# Patient Record
Sex: FEMALE | Race: WHITE
[De-identification: names, ages, dates, MRNs, and addresses within clinical notes are randomized per-mention and may not be internally consistent; named-entity substitution may affect disease eponyms.]

---

## 2017-11-30 ENCOUNTER — HOSPITAL ENCOUNTER (OUTPATIENT)
Dept: HOSPITAL 76 - LAB.WCP | Age: 72
Discharge: HOME | End: 2017-11-30
Attending: FAMILY MEDICINE
Payer: MEDICARE

## 2017-11-30 DIAGNOSIS — M10.00: ICD-10-CM

## 2017-11-30 DIAGNOSIS — E11.9: Primary | ICD-10-CM

## 2017-11-30 LAB
ALBUMIN/GLOB SERPL: 1.2 {RATIO} (ref 1–2.2)
ANION GAP SERPL CALCULATED.4IONS-SCNC: 3 MMOL/L (ref 6–13)
BASOPHILS NFR BLD AUTO: 0.1 10^3/UL (ref 0–0.1)
BASOPHILS NFR BLD AUTO: 0.8 %
BILIRUB BLD-MCNC: 0.5 MG/DL (ref 0.2–1)
BUN SERPL-MCNC: 21 MG/DL (ref 6–20)
CALCIUM UR-MCNC: 9.3 MG/DL (ref 8.5–10.3)
CHLORIDE SERPL-SCNC: 109 MMOL/L (ref 101–111)
CHOLEST SERPL-MCNC: 151 MG/DL
CO2 SERPL-SCNC: 27 MMOL/L (ref 21–32)
CREAT SERPLBLD-SCNC: 1 MG/DL (ref 0.4–1)
EOSINOPHIL # BLD AUTO: 0.5 10^3/UL (ref 0–0.7)
EOSINOPHIL NFR BLD AUTO: 4.4 %
ERYTHROCYTE [DISTWIDTH] IN BLOOD BY AUTOMATED COUNT: 14 % (ref 12–15)
EST. AVERAGE GLUCOSE BLD GHB EST-MCNC: 206 MG/DL (ref 70–100)
GFRSERPLBLD MDRD-ARVRAT: 54 ML/MIN/{1.73_M2} (ref 89–?)
GLOBULIN SER-MCNC: 3.2 G/DL (ref 2.1–4.2)
GLUCOSE SERPL-MCNC: 119 MG/DL (ref 70–100)
HBA1C BLD-MCNC: 0.99 G/DL
HCT VFR BLD AUTO: 39.3 % (ref 37–47)
HDLC SERPL-MCNC: 37 MG/DL
HDLC SERPL: 4.1 {RATIO} (ref ?–4.4)
HGB UR QL STRIP: 12.7 G/DL (ref 12–16)
LDLC/HDLC SERPL: 2.4 {RATIO} (ref ?–4.4)
LYMPHOCYTES # SPEC AUTO: 3.1 10^3/UL (ref 1.5–3.5)
LYMPHOCYTES NFR BLD AUTO: 24.7 %
MCH RBC QN AUTO: 28.9 PG (ref 27–31)
MCHC RBC AUTO-ENTMCNC: 32.3 G/DL (ref 32–36)
MCV RBC AUTO: 89.5 FL (ref 81–99)
MONOCYTES # BLD AUTO: 0.9 10^3/UL (ref 0–1)
MONOCYTES NFR BLD AUTO: 7 %
NEUTROPHILS # BLD AUTO: 7.8 10^3/UL (ref 1.5–6.6)
NEUTROPHILS # SNV AUTO: 12.4 X10^3/UL (ref 4.8–10.8)
NEUTROPHILS NFR BLD AUTO: 63.1 %
NRBC # BLD AUTO: 0.1 /100WBC
PDW BLD AUTO: 8.8 FL (ref 7.9–10.8)
POTASSIUM SERPL-SCNC: 4.4 MMOL/L (ref 3.5–5)
PROT SPEC-MCNC: 7 G/DL (ref 6.7–8.2)
RBC MAR: 4.39 10^6/UL (ref 4.2–5.4)
SODIUM SERPLBLD-SCNC: 139 MMOL/L (ref 135–145)
TRIGL P FAST SERPL-MCNC: 131 MG/DL
URATE SERPL-MCNC: 8.1 MG/DL (ref 2.6–7.2)
VLDLC SERPL-SCNC: 26 MG/DL
WBC # BLD: 12.4 X10^3/UL

## 2017-11-30 PROCEDURE — 36415 COLL VENOUS BLD VENIPUNCTURE: CPT

## 2017-11-30 PROCEDURE — 80053 COMPREHEN METABOLIC PANEL: CPT

## 2017-11-30 PROCEDURE — 84550 ASSAY OF BLOOD/URIC ACID: CPT

## 2017-11-30 PROCEDURE — 83036 HEMOGLOBIN GLYCOSYLATED A1C: CPT

## 2017-11-30 PROCEDURE — 80061 LIPID PANEL: CPT

## 2017-11-30 PROCEDURE — 85025 COMPLETE CBC W/AUTO DIFF WBC: CPT

## 2018-02-02 ENCOUNTER — HOSPITAL ENCOUNTER (OUTPATIENT)
Dept: HOSPITAL 76 - EMS | Age: 73
End: 2018-02-02
Attending: SURGERY
Payer: MEDICARE

## 2018-02-02 ENCOUNTER — HOSPITAL ENCOUNTER (EMERGENCY)
Dept: HOSPITAL 76 - ED | Age: 73
Discharge: HOME | End: 2018-02-02
Payer: OTHER GOVERNMENT

## 2018-02-02 VITALS — DIASTOLIC BLOOD PRESSURE: 71 MMHG | SYSTOLIC BLOOD PRESSURE: 160 MMHG

## 2018-02-02 DIAGNOSIS — I10: ICD-10-CM

## 2018-02-02 DIAGNOSIS — R11.2: Primary | ICD-10-CM

## 2018-02-02 DIAGNOSIS — R19.7: ICD-10-CM

## 2018-02-02 DIAGNOSIS — Z79.4: ICD-10-CM

## 2018-02-02 DIAGNOSIS — E11.9: ICD-10-CM

## 2018-02-02 DIAGNOSIS — R42: Primary | ICD-10-CM

## 2018-02-02 LAB
ALBUMIN DIAFP-MCNC: 4.1 G/DL (ref 3.2–5.5)
ALBUMIN/GLOB SERPL: 1.2 {RATIO} (ref 1–2.2)
ALP SERPL-CCNC: 62 IU/L (ref 42–121)
ALT SERPL W P-5'-P-CCNC: 15 IU/L (ref 10–60)
ANION GAP SERPL CALCULATED.4IONS-SCNC: 8 MMOL/L (ref 6–13)
AST SERPL W P-5'-P-CCNC: 18 IU/L (ref 10–42)
BASE EXCESS BLDV CALC-SCNC: -0.2 MMOL/L
BASOPHILS NFR BLD AUTO: 0.1 10^3/UL (ref 0–0.1)
BASOPHILS NFR BLD AUTO: 0.6 %
BILIRUB BLD-MCNC: 0.7 MG/DL (ref 0.2–1)
BUN SERPL-MCNC: 26 MG/DL (ref 6–20)
CALCIUM UR-MCNC: 10 MG/DL (ref 8.5–10.3)
CHLORIDE SERPL-SCNC: 108 MMOL/L (ref 101–111)
CLARITY UR REFRACT.AUTO: CLEAR
CO2 BLDV-SCNC: 21.9 MMOL/L (ref 24–29)
CO2 SERPL-SCNC: 21 MMOL/L (ref 21–32)
CREAT SERPLBLD-SCNC: 0.8 MG/DL (ref 0.4–1)
EOSINOPHIL # BLD AUTO: 0.4 10^3/UL (ref 0–0.7)
EOSINOPHIL NFR BLD AUTO: 2.9 %
ERYTHROCYTE [DISTWIDTH] IN BLOOD BY AUTOMATED COUNT: 13.8 % (ref 12–15)
GFRSERPLBLD MDRD-ARVRAT: 70 ML/MIN/{1.73_M2} (ref 89–?)
GLOBULIN SER-MCNC: 3.3 G/DL (ref 2.1–4.2)
GLUCOSE SERPL-MCNC: 168 MG/DL (ref 70–100)
GLUCOSE UR QL STRIP.AUTO: NEGATIVE MG/DL
HGB UR QL STRIP: 13.3 G/DL (ref 12–16)
INFLUENZA A & B AG INTERPRET: (no result)
KETONES SERPL STRIP-SCNC: NEGATIVE MMOL/L
KETONES UR QL STRIP.AUTO: NEGATIVE MG/DL
LIPASE SERPL-CCNC: 13 U/L (ref 22–51)
LYMPHOCYTES # SPEC AUTO: 3 10^3/UL (ref 1.5–3.5)
LYMPHOCYTES NFR BLD AUTO: 24.4 %
MAGNESIUM SERPL-MCNC: 1.5 MG/DL (ref 1.7–2.8)
MCH RBC QN AUTO: 28.7 PG (ref 27–31)
MCHC RBC AUTO-ENTMCNC: 33 G/DL (ref 32–36)
MCV RBC AUTO: 86.8 FL (ref 81–99)
MONOCYTES # BLD AUTO: 0.7 10^3/UL (ref 0–1)
MONOCYTES NFR BLD AUTO: 5.4 %
NEUTROPHILS # BLD AUTO: 8.3 10^3/UL (ref 1.5–6.6)
NEUTROPHILS # SNV AUTO: 12.4 X10^3/UL (ref 4.8–10.8)
NEUTROPHILS NFR BLD AUTO: 66.7 %
NITRITE UR QL STRIP.AUTO: NEGATIVE
PCO2 BLDV: 26 MMHG (ref 41–51)
PDW BLD AUTO: 8.6 FL (ref 7.9–10.8)
PH BLDV: 7.53 [PH] (ref 7.31–7.41)
PH UR STRIP.AUTO: 6 PH (ref 5–7.5)
PHOSPHATE BLD-MCNC: 1.3 MG/DL (ref 2.5–4.6)
PLATELET # BLD: 276 10^3/UL (ref 130–450)
PO2 BLDV: 77.3 MMHG (ref 25–47)
PROT SPEC-MCNC: 7.4 G/DL (ref 6.7–8.2)
PROT UR STRIP.AUTO-MCNC: (no result) MG/DL
RBC # UR STRIP.AUTO: NEGATIVE /UL
RBC MAR: 4.65 10^6/UL (ref 4.2–5.4)
SODIUM SERPLBLD-SCNC: 137 MMOL/L (ref 135–145)
SP GR UR STRIP.AUTO: 1.02 (ref 1–1.03)
UROBILINOGEN UR QL STRIP.AUTO: (no result) E.U./DL
UROBILINOGEN UR STRIP.AUTO-MCNC: NEGATIVE MG/DL

## 2018-02-02 PROCEDURE — 83735 ASSAY OF MAGNESIUM: CPT

## 2018-02-02 PROCEDURE — 99284 EMERGENCY DEPT VISIT MOD MDM: CPT

## 2018-02-02 PROCEDURE — 87086 URINE CULTURE/COLONY COUNT: CPT

## 2018-02-02 PROCEDURE — 85025 COMPLETE CBC W/AUTO DIFF WBC: CPT

## 2018-02-02 PROCEDURE — 84484 ASSAY OF TROPONIN QUANT: CPT

## 2018-02-02 PROCEDURE — 83690 ASSAY OF LIPASE: CPT

## 2018-02-02 PROCEDURE — 81003 URINALYSIS AUTO W/O SCOPE: CPT

## 2018-02-02 PROCEDURE — 80053 COMPREHEN METABOLIC PANEL: CPT

## 2018-02-02 PROCEDURE — 82803 BLOOD GASES ANY COMBINATION: CPT

## 2018-02-02 PROCEDURE — 96376 TX/PRO/DX INJ SAME DRUG ADON: CPT

## 2018-02-02 PROCEDURE — 96374 THER/PROPH/DIAG INJ IV PUSH: CPT

## 2018-02-02 PROCEDURE — 96361 HYDRATE IV INFUSION ADD-ON: CPT

## 2018-02-02 PROCEDURE — 84100 ASSAY OF PHOSPHORUS: CPT

## 2018-02-02 PROCEDURE — 82009 KETONE BODYS QUAL: CPT

## 2018-02-02 PROCEDURE — 51701 INSERT BLADDER CATHETER: CPT

## 2018-02-02 PROCEDURE — 87275 INFLUENZA B AG IF: CPT

## 2018-02-02 PROCEDURE — 87276 INFLUENZA A AG IF: CPT

## 2018-02-02 PROCEDURE — 81001 URINALYSIS AUTO W/SCOPE: CPT

## 2018-02-02 NOTE — ED PHYSICIAN DOCUMENTATION
PD HPI NVD





- Stated complaint


Stated Complaint: NVD





- Chief complaint


Chief Complaint: Abd Pain





- History obtained from


History obtained from: Patient, Friend





- History of Present Illness


Timing - onset: Today


Timing - details: Abrupt onset, Still present


Associated symptoms: Dizzy.  No: Fever, Chest pain, Melena, Near syncope / 

syncope


Similar symptoms before: Has not had sx before


Recently seen: Not recently seen





- Additonal information


Additional information: 





Patient is a 73 year old female with a history of diabetes who is presenting to 

the emergency department for nausea, vomiting and diarrhea.  patient states 

that she woke up from sleep and was nauseated.  patient had two episodes of 

vomiting and two episodes of diarrhea.  patient called her neighbor to bring 

her in for evaluation.  Patient stated that her dizziness was room spinning and 

it changed with position.  Patient states that she had a similar episode a few 

years ago.  





Review of Systems


Constitutional: denies: Fever, Chills


Eyes: denies: Decreased vision


Ears: reports: Ear pain


Nose: denies: Congestion


Throat: reports: Reviewed and negative


Cardiac: denies: Chest pain / pressure, Palpitations


Respiratory: denies: Dyspnea, Cough


GI: reports: Nausea, Vomiting, Diarrhea.  denies: Abdominal Pain


: reports: Reviewed and negative


Skin: denies: Rash, Lesions


Musculoskeletal: denies: Neck pain, Back pain


Immunocompromised: denies: Immunocompromised





PD PAST MEDICAL HISTORY





- Past Medical History


Past Medical History: Yes


Cardiovascular: Hypertension


Endocrine/Autoimmune: Type 2 diabetes





- Past Surgical History


Past Surgical History: Yes


General: Cholecystectomy, Appendectomy, Hiatal hernia repair





- Present Medications


Home Medications: 


 Ambulatory Orders











 Medication  Instructions  Recorded  Confirmed


 


Atenolol 50 mg PO DAILY 03/14/14 01/05/17


 


Insulin Aspart (Vial) [NovoLOG] 13 - 34 units SUBQ TIDWM 03/14/14 01/05/17


 


Allopurinol 150 mg PO DAILY 12/31/15 01/05/17


 


Aspirin [Steve Chewable Aspirin] 81 mg PO DAILY 12/31/15 01/05/17


 


Gabapentin 300 mg PO QPM 12/31/15 01/05/17


 


Losartan [Cozaar] 50 mg PO DAILY 12/31/15 01/05/17


 


Simvastatin 20 mg PO QPM 12/31/15 01/05/17


 


Spironolactone 25 mg PO DAILY 12/31/15 01/05/17


 


Insulin Glargine [Lantus Solostar] 30 units SQ BID 05/31/16 01/05/17


 


Cholecalciferol (Vitamin D3) 2,000 unit PO DAILY 12/16/16 01/05/17





[Vitamin D3]   


 


Meclizine [Antivert] 25 mg PO Q6H #20 tablet 02/02/18 


 


Ondansetron Odt [Zofran] 4 mg TL Q6H PRN #20 tablet 02/02/18 














- Allergies


Allergies/Adverse Reactions: 


 Allergies











Allergy/AdvReac Type Severity Reaction Status Date / Time


 


succinylcholine Allergy Unknown Respiratory Verified 09/01/14 04:11


 


bacitracin AdvReac Unknown Rash Verified 09/01/14 04:11





[From Neosporin     





([id number])]     


 


bacitracin zinc * AdvReac Unknown Rash Verified 09/01/14 04:11





[From Neosporin     





([id number])]     


 


diphenhydramine HCl * AdvReac Unknown Edema Verified 09/01/14 04:11





[From Benadryl]     


 


neomycin sulfate * AdvReac Unknown Rash Verified 09/01/14 04:11





[From Neosporin     





([id number])]     


 


polymyxin B AdvReac Unknown Rash Verified 09/01/14 04:11





[From Neosporin     





([id number])]     


 


Sulfa (Sulfonamide AdvReac Unknown Rash Verified 09/01/14 04:11





Antibiotics)     














- Social History


Does the pt smoke?: No


Smoking Status: Never smoker


Does the pt drink ETOH?: No


Does the pt have substance abuse?: No





- Immunizations


Immunizations are current?: Yes





- POLST


Patient has POLST: No





PD ED PE NORMAL





- Vitals


Vital signs reviewed: Yes





- General


General: Alert and oriented X 3





- HEENT


HEENT: Atraumatic, PERRL, Moist mucous membranes





- Neck


Neck: Supple, no meningeal sign





- Cardiac


Cardiac: RRR, No murmur





- Respiratory


Respiratory: No respiratory distress





- Abdomen


Abdomen: Soft, Non distended





- Derm


Derm: Normal color, Warm and dry, No rash





- Extremities


Extremities: No deformity, Normal ROM s pain





- Neuro


Neuro: Alert and oriented X 3


Eye Opening: Spontaneous


Motor: Obeys Commands


Verbal: Oriented


GCS Score: 15





PD ED PE EXPANDED





- Cardiac


Cardiac: Terrence





- Neuro


Neuro: Alert and Oriented X 3, Normal motor, Normal Sensation, Normal Speech, 

Nystagmus (nystagmus with rightward gaze).  No: Weakness, Aphasia, Dysarthria





Results





- Vitals


Vitals: 


 Vital Signs - 24 hr











  02/02/18 02/02/18 02/02/18





  03:21 03:59 04:19


 


Temperature 36.0 C L  


 


Heart Rate 58 L 57 L 58 L


 


Respiratory 24 13 12





Rate   


 


Blood Pressure 181/68 H 141/61 H 135/50 H


 


O2 Saturation 98 96 94














  02/02/18





  04:29


 


Temperature 


 


Heart Rate 52 L


 


Respiratory 20





Rate 


 


Blood Pressure 160/71 H


 


O2 Saturation 98








 Oxygen











O2 Source                      Room air

















- Tele (time rhythm occurred)


  ** 0415


Telemetry / rhythm strip: Rate (58), NSR





- Labs


Labs: 


 Laboratory Tests











  02/02/18 02/02/18 02/02/18





  03:30 03:30 03:30


 


WBC  12.4 H  


 


RBC  4.65  


 


Hgb  13.3  


 


Hct  40.4  


 


MCV  86.8  


 


MCH  28.7  


 


MCHC  33.0  


 


RDW  13.8  


 


Plt Count  276  


 


MPV  8.6  


 


Neut #  8.3 H  


 


Lymph #  3.0  


 


Mono #  0.7  


 


Eos #  0.4  


 


Baso #  0.1  


 


Absolute Nucleated RBC  0.00  


 


Nucleated RBC %  0.0  


 


VBG pH    7.527 H


 


VBG pCO2    26.0 L


 


VBG pO2    77.3 H


 


VBG HCO3    21.1 L


 


VBG Total CO2    21.9 L


 


VBG O2 Saturation    97.1 H


 


VBG Base Excess    -0.2


 


Sodium   137 


 


Potassium   3.7 


 


Chloride   108 


 


Carbon Dioxide   21 


 


Anion Gap   8.0 


 


BUN   26 H 


 


Creatinine   0.8 


 


Estimated GFR (MDRD)   70 L 


 


Glucose   168 H 


 


Calcium   10.0 


 


Phosphorus   1.3 L 


 


Magnesium   1.5 L 


 


Total Bilirubin   0.7 


 


AST   18 


 


ALT   15 


 


Alkaline Phosphatase   62 


 


Total Protein   7.4 


 


Albumin   4.1 


 


Globulin   3.3 


 


Albumin/Globulin Ratio   1.2 


 


Lipase   13 L 


 


Urine Color   


 


Urine Clarity   


 


Urine pH   


 


Ur Specific Gravity   


 


Urine Protein   


 


Urine Glucose (UA)   


 


Urine Ketones   


 


Urine Occult Blood   


 


Urine Nitrite   


 


Urine Bilirubin   


 


Urine Urobilinogen   


 


Ur Leukocyte Esterase   


 


Ur Microscopic Review   


 


Urine Culture Comments   


 


Serum Ketones   NEGATIVE 


 


Influenza A (Rapid)   


 


Influenza B (Rapid)   


 


Influenza Types A,B Ag   














  02/02/18 02/02/18





  03:34 03:50


 


WBC  


 


RBC  


 


Hgb  


 


Hct  


 


MCV  


 


MCH  


 


MCHC  


 


RDW  


 


Plt Count  


 


MPV  


 


Neut #  


 


Lymph #  


 


Mono #  


 


Eos #  


 


Baso #  


 


Absolute Nucleated RBC  


 


Nucleated RBC %  


 


VBG pH  


 


VBG pCO2  


 


VBG pO2  


 


VBG HCO3  


 


VBG Total CO2  


 


VBG O2 Saturation  


 


VBG Base Excess  


 


Sodium  


 


Potassium  


 


Chloride  


 


Carbon Dioxide  


 


Anion Gap  


 


BUN  


 


Creatinine  


 


Estimated GFR (MDRD)  


 


Glucose  


 


Calcium  


 


Phosphorus  


 


Magnesium  


 


Total Bilirubin  


 


AST  


 


ALT  


 


Alkaline Phosphatase  


 


Total Protein  


 


Albumin  


 


Globulin  


 


Albumin/Globulin Ratio  


 


Lipase  


 


Urine Color   YELLOW


 


Urine Clarity   CLEAR


 


Urine pH   6.0


 


Ur Specific Gravity   1.020


 


Urine Protein   TRACE


 


Urine Glucose (UA)   NEGATIVE


 


Urine Ketones   NEGATIVE


 


Urine Occult Blood   NEGATIVE


 


Urine Nitrite   NEGATIVE


 


Urine Bilirubin   NEGATIVE


 


Urine Urobilinogen   0.2 (NORMAL)


 


Ur Leukocyte Esterase   NEGATIVE


 


Ur Microscopic Review   NOT INDICATED


 


Urine Culture Comments   NOT INDICATED


 


Serum Ketones  


 


Influenza A (Rapid)  Negative 


 


Influenza B (Rapid)  Negative 


 


Influenza Types A,B Ag  - 














PD MEDICAL DECISION MAKING





- ED course


Complexity details: reviewed old records, reviewed results, re-evaluated patient

, considered differential, d/w patient, d/w family


ED course: 





Patient was seen and examined at bedside.  IV access was gained and labs were 

drawn.  patient was treated with NS bolus and zofran.  Patient continued to 

have nausea and was treated with an additional 4mg of zofran and 25mg of 

meclizine.  Eply maneuver was tried but patient could not tolerate it.  Patient 

eventually responded to the therapy and symptoms were likely secondary to bpv.  

Patient and daughter were given handouts for home eply procedures and 

prescriptions were written.  patient required no further inpatient work up and 

was stable for discharge with outpatient follow up.   





Departure





- Departure


Disposition: 01 Home, Self Care


Clinical Impression: 


 Vertigo





Condition: Good


Instructions:  ED BPV Vertigo


Follow-Up: 


Heaven Alonzo DO [Primary Care Provider] - 


Prescriptions: 


Meclizine [Antivert] 25 mg PO Q6H #20 tablet


Ondansetron Odt [Zofran] 4 mg TL Q6H PRN #20 tablet


 PRN Reason: Nausea / Vomiting


Comments: 


Your symptoms today are being caused by benign positional vertigo.  If your 

symptoms return you should try the home eply maneuvers and try the meclizine.  

You can take the zofran as needed for nausea and make sure you stay well 

hydrated.  You should follow up with your doctor if your symptoms persist.  You 

may return to the emergency department at any time for new, worsening or 

uncontrollable symptoms.

## 2018-03-08 ENCOUNTER — HOSPITAL ENCOUNTER (OUTPATIENT)
Dept: HOSPITAL 76 - DI | Age: 73
Discharge: HOME | End: 2018-03-08
Attending: FAMILY MEDICINE
Payer: MEDICARE

## 2018-03-08 DIAGNOSIS — R20.2: Primary | ICD-10-CM

## 2018-03-08 DIAGNOSIS — R90.82: ICD-10-CM

## 2018-03-08 PROCEDURE — 70551 MRI BRAIN STEM W/O DYE: CPT

## 2018-03-08 NOTE — MRI REPORT
MRI BRAIN WITHOUT CONTRAST

 

INDICATION: 73-year-old female with facial paresthesias. Please assess.

 

TECHNIQUE:

1. T1 sagittal and fat-saturated T2 coronal.

2. Axial T1, FLAIR, T2* and DWI.

3. Thin slice, high-resolution, 3D balanced FFE and T1 fat saturated axials (internal auditory canals
).

 

COMPARISON: 01/31/2013

 

FINDINGS:

 

Internal Auditory Canals:

A detailed study of the internal auditory canals has been performed. According to the MRI technologis
t the patient provided a history of decreased balance and vertigo for one month. On the balanced FFE 
axial sequence, the vestibulocochlear nerve bundles are well seen in the CP angle cisterns and intern
al auditory canals bilaterally. No focal mass lesion is demonstrated. There appear to be normal fluid
 filled spaces in the distribution of the cochlea, vestibules, and semicircular canals bilaterally. T
here is no apparent dehiscence of either superior semicircular canal on the fat saturated T2 coronal 
sequence. No abnormal enlargement of either endolymphatic duct or sac is demonstrated.

 

Brain:

There is an essentially stable pattern of generalized, cerebral and cerebellar volume loss and associ
ated, mild third/lateral ventriculomegaly. No evidence of hydrocephalus.

 

A mild amount of white matter disease is identified in the supratentorial brain, essentially unchange
d. No apparent interval progression since prior study. Again noted is a more prominent, focal, cluste
r of T2 hyperintensities in the deep and subcortical white matter at anterior left temporal lobe, unc
hanged. The stability over the past 5 years effectively confirms a benign etiology.

 

There appear to be flow voids for the main intracranial arteries. No abnormal diffusion restriction i
s demonstrated. No evidence of acute or chronic hemorrhage on T2*GRE sequence.

 

No abnormal extraaxial fluid collection. No mass effect or midline shift.

 

There is concavity of the medial wall of the right orbit, consistent with the sequela of remote, blow
out-type fracture. There is associated, prolapse of intraorbital fat into the concave defect. No prol
apse of medial rectus muscle. No appreciable right-sided enophthalmos. Limited assessment of the orbi
ts reveals no other gross pathology.

 

There is minor mucosal thickening in a few ethmoid air cells. The paranasal sinuses are otherwise ess
entially clear. No mastoid or middle ear effusion is identified.

 

Marrow signal intensity in the regional skeletal structures is unremarkable.

 

IMPRESSION:

1. Age-appropriate senescent change showing no apparent interval progression when compared to examina
tion from 5 years ago.

2. A mild amount of white matter disease is again identified in the supratentorial brain showing no o
bvious interval progression when compared to the examination from January 2013. 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
esents chronic microangiopathy.

3. No other significant intracranial findings on this unenhanced brain MRI. In particular, no evidenc
e of infarction, hemorrhage, space-occupying mass lesion or other acute/subacute intracranial patholo
gy.

Referring Provider Line: [phone]

 

SITE ID: [id number]

## 2019-01-14 ENCOUNTER — HOSPITAL ENCOUNTER (OUTPATIENT)
Dept: HOSPITAL 76 - LAB.WCP | Age: 74
Discharge: HOME | End: 2019-01-14
Attending: FAMILY MEDICINE
Payer: OTHER GOVERNMENT

## 2019-01-14 DIAGNOSIS — E11.9: Primary | ICD-10-CM

## 2019-01-14 DIAGNOSIS — E83.42: ICD-10-CM

## 2019-01-14 DIAGNOSIS — M10.00: ICD-10-CM

## 2019-01-14 LAB
BASOPHILS NFR BLD AUTO: 0.1 10^3/UL (ref 0–0.1)
BASOPHILS NFR BLD AUTO: 0.6 %
CHOLEST SERPL-MCNC: 141 MG/DL
EOSINOPHIL # BLD AUTO: 0.4 10^3/UL (ref 0–0.7)
EOSINOPHIL NFR BLD AUTO: 4.1 %
ERYTHROCYTE [DISTWIDTH] IN BLOOD BY AUTOMATED COUNT: 13.3 % (ref 12–15)
HDLC SERPL-MCNC: 34 MG/DL
HDLC SERPL: 4.1 {RATIO} (ref ?–4.4)
HGB UR QL STRIP: 13 G/DL (ref 12–16)
LDLC SERPL CALC-MCNC: 71 MG/DL
LDLC/HDLC SERPL: 2.1 {RATIO} (ref ?–4.4)
LYMPHOCYTES # SPEC AUTO: 2.4 10^3/UL (ref 1.5–3.5)
LYMPHOCYTES NFR BLD AUTO: 24.7 %
MAGNESIUM SERPL-MCNC: 1.5 MG/DL (ref 1.7–2.8)
MCH RBC QN AUTO: 29 PG (ref 27–31)
MCHC RBC AUTO-ENTMCNC: 33.6 G/DL (ref 32–36)
MCV RBC AUTO: 86.4 FL (ref 81–99)
MONOCYTES # BLD AUTO: 0.7 10^3/UL (ref 0–1)
MONOCYTES NFR BLD AUTO: 7 %
NEUTROPHILS # BLD AUTO: 6.2 10^3/UL (ref 1.5–6.6)
NEUTROPHILS # SNV AUTO: 9.7 X10^3/UL (ref 4.8–10.8)
NEUTROPHILS NFR BLD AUTO: 63.6 %
PDW BLD AUTO: 8.6 FL (ref 7.9–10.8)
PLATELET # BLD: 320 10^3/UL (ref 130–450)
RBC MAR: 4.48 10^6/UL (ref 4.2–5.4)
URATE SERPL-MCNC: 9.2 MG/DL (ref 2.6–7.2)
VIT B12 SERPL-MCNC: 638 PG/ML (ref 180–914)
VLDLC SERPL-SCNC: 36 MG/DL

## 2019-01-14 PROCEDURE — 85025 COMPLETE CBC W/AUTO DIFF WBC: CPT

## 2019-01-14 PROCEDURE — 36415 COLL VENOUS BLD VENIPUNCTURE: CPT

## 2019-01-14 PROCEDURE — 80061 LIPID PANEL: CPT

## 2019-01-14 PROCEDURE — 83735 ASSAY OF MAGNESIUM: CPT

## 2019-01-14 PROCEDURE — 84443 ASSAY THYROID STIM HORMONE: CPT

## 2019-01-14 PROCEDURE — 84550 ASSAY OF BLOOD/URIC ACID: CPT

## 2019-01-14 PROCEDURE — 82607 VITAMIN B-12: CPT

## 2019-01-14 PROCEDURE — 83721 ASSAY OF BLOOD LIPOPROTEIN: CPT

## 2019-02-18 ENCOUNTER — HOSPITAL ENCOUNTER (OUTPATIENT)
Dept: HOSPITAL 76 - LAB.WCP | Age: 74
Discharge: HOME | End: 2019-02-18
Attending: FAMILY MEDICINE
Payer: MEDICARE

## 2019-02-18 DIAGNOSIS — E11.9: Primary | ICD-10-CM

## 2019-02-18 LAB
EST. AVERAGE GLUCOSE BLD GHB EST-MCNC: 183 MG/DL (ref 70–100)
HB2 TOTAL: 14 G/DL
HBA1C BLD-MCNC: 0.9 G/DL
HEMOGLOBIN A1C %: 8 % (ref 4.6–6.2)

## 2019-02-18 PROCEDURE — 83036 HEMOGLOBIN GLYCOSYLATED A1C: CPT

## 2019-02-18 PROCEDURE — 36415 COLL VENOUS BLD VENIPUNCTURE: CPT

## 2019-07-19 ENCOUNTER — HOSPITAL ENCOUNTER (OUTPATIENT)
Dept: HOSPITAL 76 - LAB | Age: 74
Discharge: HOME | End: 2019-07-19
Attending: ORTHOPAEDIC SURGERY
Payer: MEDICARE

## 2019-07-19 DIAGNOSIS — M65.342: ICD-10-CM

## 2019-07-19 DIAGNOSIS — G56.02: ICD-10-CM

## 2019-07-19 DIAGNOSIS — Z01.812: Primary | ICD-10-CM

## 2019-07-19 LAB
ANION GAP SERPL CALCULATED.4IONS-SCNC: 11 MMOL/L (ref 6–13)
BUN SERPL-MCNC: 22 MG/DL (ref 6–20)
CALCIUM UR-MCNC: 10 MG/DL (ref 8.5–10.3)
CHLORIDE SERPL-SCNC: 102 MMOL/L (ref 101–111)
CO2 SERPL-SCNC: 24 MMOL/L (ref 21–32)
CREAT SERPLBLD-SCNC: 1.1 MG/DL (ref 0.4–1)
GFRSERPLBLD MDRD-ARVRAT: 48 ML/MIN/{1.73_M2} (ref 89–?)
GLUCOSE SERPL-MCNC: 213 MG/DL (ref 70–100)
SODIUM SERPLBLD-SCNC: 137 MMOL/L (ref 135–145)

## 2019-07-19 PROCEDURE — 80048 BASIC METABOLIC PNL TOTAL CA: CPT

## 2019-07-19 PROCEDURE — 36415 COLL VENOUS BLD VENIPUNCTURE: CPT

## 2019-07-30 ENCOUNTER — HOSPITAL ENCOUNTER (OUTPATIENT)
Dept: HOSPITAL 76 - SDS | Age: 74
Discharge: HOME | End: 2019-07-30
Attending: ORTHOPAEDIC SURGERY
Payer: MEDICARE

## 2019-07-30 VITALS — DIASTOLIC BLOOD PRESSURE: 67 MMHG | SYSTOLIC BLOOD PRESSURE: 148 MMHG

## 2019-07-30 DIAGNOSIS — I10: ICD-10-CM

## 2019-07-30 DIAGNOSIS — G56.03: Primary | ICD-10-CM

## 2019-07-30 DIAGNOSIS — E11.42: ICD-10-CM

## 2019-07-30 DIAGNOSIS — M65.342: ICD-10-CM

## 2019-07-30 DIAGNOSIS — E66.9: ICD-10-CM

## 2019-07-30 PROCEDURE — 0LN80ZZ RELEASE LEFT HAND TENDON, OPEN APPROACH: ICD-10-PCS | Performed by: ORTHOPAEDIC SURGERY

## 2019-07-30 PROCEDURE — 64721 CARPAL TUNNEL SURGERY: CPT

## 2019-07-30 PROCEDURE — 26055 INCISE FINGER TENDON SHEATH: CPT

## 2019-07-30 PROCEDURE — 01N50ZZ RELEASE MEDIAN NERVE, OPEN APPROACH: ICD-10-PCS | Performed by: ORTHOPAEDIC SURGERY

## 2019-07-30 NOTE — OPERATIVE REPORT
DATE OF SERVICE: 07/30/2019

Physician: Lalo Almanza MD

 

POSTOPERATIVE DIAGNOSIS:  Left carpal tunnel syndrome and left ring finger trigger finger.

 

PROCEDURE PERFORMED:  Left carpal tunnel release and a release of left ring finger trigger finger.

 

OPERATING SURGEON:  Lalo Almanza MD

 

ANESTHESIA:  Local MAC with Mor Pratt.

 

INDICATIONS FOR SURGERY:  This is a 74-year-old female with progressive carpal tunnel syndromes and i
n her left hand and triggering of her ring finger.  She has failed nonoperative care and desires surg
ical repair.

 

DESCRIPTION OF OPERATIVE PROCEDURE:  Patient was taken to the operating room, was given a MAC anesthe
tic, a carpal tunnel block and a small block at the trigger finger release site with lidocaine and Ma
rcaine.  Following a delay in the prep and drape, patient's surgery was undertaken, first performing 
a 1-1/4 inch incision in the palm in line with the carpal tunnel and the ulnar border of the middle f
vishnu.  This incision was taken through skin and subcutaneous tissue down to the transverse carpal li
gament, which was divided in line with the incision.  The ultimate release extended from the distal f
lexion crease of the wrist to the proximal palmar flexion crease.  There were no abnormalities in the
 tunnel.  This area was irrigated and closed with interrupted 4-0 nylon.  Patient's ring finger was a
ddressed through a transverse incision overlying the A1 pulley and a careful spreading dissection aj
n to the pulley and a surgical release with a scalpel longitudinally of the A1 pulley.  This relieved
 triggering.  Wound was irrigated and also closed with 4-0 nylon interrupted.  Sterile dressings were
 applied.  Patient was taken to the recovery room in stable condition.

 

ESTIMATED BLOOD LOSS:  Minimal.

 

COMPLICATIONS:  None.

 

SPONGE AND NEEDLE COUNTS:  Correct. 

 

 

DD: 07/30/2019 12:38

TD: 07/30/2019 12:41

Job #: [id number]

## 2019-07-30 NOTE — ANESTHESIA
Pre-Anesthesia VS, & Labs





- Diagnosis





Left carpal tunnel syndrome





- Procedure





Left carpal tunnel release


Vital Signs: 





                                        











Temp Pulse Resp BP Pulse Ox


 


 36.5 C   68   14   154/89 H  99 


 


 07/30/19 07:32  07/30/19 07:32  07/30/19 07:32  07/30/19 07:32  07/30/19 07:32














                                        





Height                           4 ft 11 in


Weight (kg)                      100.5 kg


Body Mass Index                  45.8











- NPO


>8 hours





- Pregnancy


Is Patient Pregnant?: Not Applicable





- Lab Results


Lab results reviewed: No





Home Medications and Allergies


Home Medications: 


Ambulatory Orders





Magnesium Oxide [Magnesium] 400 mg PO QPM 07/15/19 


Thiamine HCl [Vitamin B-1] 100 mg PO DAILY 07/15/19 


Tolterodine Tartrate [Tolterodine Tartrate ER] 4 mg PO DAILY 07/15/19 


Triamcinolone 0.1% Cream [Kenalog 0.1% Cream] 1 applic TOP BID 07/15/19 











                                        





Atenolol 50 mg PO DAILY 03/14/14 


Insulin Aspart (Vial) [NovoLOG] 15 units SUBQ TIDWM 03/14/14 


Allopurinol 150 mg PO DAILY 12/31/15 


Gabapentin 300 mg PO QPM 12/31/15 


Losartan [Cozaar] 50 mg PO DAILY 12/31/15 


Simvastatin 20 mg PO QPM 12/31/15 


Spironolactone 25 mg PO DAILY 12/31/15 


Insulin Glargine [Lantus Solostar] 28 units SQ BID 05/31/16 


Magnesium Oxide [Magnesium] 400 mg PO QPM 07/15/19 


Thiamine HCl [Vitamin B-1] 100 mg PO DAILY 07/15/19 


Tolterodine Tartrate [Tolterodine Tartrate ER] 4 mg PO DAILY 07/15/19 


Triamcinolone 0.1% Cream [Kenalog 0.1% Cream] 1 applic TOP BID 07/15/19 








Allergies/Adverse Reactions: 


                                    Allergies











Allergy/AdvReac Type Severity Reaction Status Date / Time


 


succinylcholine Allergy Unknown Respiratory Verified 09/01/14 04:11


 


bacitracin AdvReac Unknown Rash Verified 09/01/14 04:11





[From Neosporin     





([id number])]     


 


bacitracin zinc * AdvReac Unknown Rash Verified 09/01/14 04:11





[From Neosporin     





([id number])]     


 


diphenhydramine HCl * AdvReac Unknown Edema Verified 09/01/14 04:11





[From Benadryl]     


 


neomycin sulfate * AdvReac Unknown Rash Verified 09/01/14 04:11





[From Neosporin     





([id number])]     


 


polymyxin B AdvReac Unknown Rash Verified 09/01/14 04:11





[From Neosporin     





([id number])]     


 


Sulfa (Sulfonamide AdvReac Unknown Rash Verified 09/01/14 04:11





Antibiotics)     














Anes History & Medical History





- Anesthetic History


Anesthesia Complications: reports: Other-see comment (Prolonged paralysis with 

succinylcholine)


Family history of Anesthesia Complications: Denies


Family history of Malignant Hyperthermia: Denies





- Medical History


Cardiovascular: reports: Hypertension, High cholesterol


Pulmonary: reports: Shortness of breath


Gastrointestinal: reports: None, Colon polyps


Urinary: reports: Incontinence


Neuro: reports: None


Musculoskeletal: reports: Osteoarthritis, Gout


Endocrine/Autoimmune: reports: Type 2 diabetes


Blood Disorders: reports: None


Skin: reports: Other


Smoking Status: Never smoker


Psychosocial: reports: No issues indicated





- Surgical History


General: Cholecystectomy, Appendectomy, Other





Exam


General: Alert, Oriented x3, Cooperative


Dental: Dentures full Upper, Partials Lower


Mouth Opening: Greater than 4 Fingerbreadths


Neck Mobility: Normal


Mallampati classification: I


Thyromental Distance: 4-6 cm


Respiratory: Lungs clear


Cardiovascular: Regular rate


Mental/Cognitive Status: Alert/Oriented X3





Plan


Anesthesia Type: General


Consent for Procedure(s) Verified and Reviewed: Yes


Code Status: Attempt Resuscitation


ASA classification: 3-Severe systemic disease


Is this case an emergency?: No

## 2020-01-02 ENCOUNTER — HOSPITAL ENCOUNTER (EMERGENCY)
Dept: HOSPITAL 76 - ED | Age: 75
Discharge: HOME | End: 2020-01-02
Payer: MEDICARE

## 2020-01-02 ENCOUNTER — HOSPITAL ENCOUNTER (OUTPATIENT)
Dept: HOSPITAL 76 - EMS | Age: 75
Discharge: ANOTHER HOSPITAL | End: 2020-01-02
Attending: SURGERY
Payer: MEDICARE

## 2020-01-02 VITALS — DIASTOLIC BLOOD PRESSURE: 78 MMHG | SYSTOLIC BLOOD PRESSURE: 169 MMHG

## 2020-01-02 DIAGNOSIS — E11.9: ICD-10-CM

## 2020-01-02 DIAGNOSIS — I10: ICD-10-CM

## 2020-01-02 DIAGNOSIS — R07.89: Primary | ICD-10-CM

## 2020-01-02 DIAGNOSIS — R07.9: Primary | ICD-10-CM

## 2020-01-02 LAB
ALBUMIN DIAFP-MCNC: 3.6 G/DL (ref 3.2–5.5)
ALBUMIN/GLOB SERPL: 1.1 {RATIO} (ref 1–2.2)
ALP SERPL-CCNC: 67 IU/L (ref 42–121)
ALT SERPL W P-5'-P-CCNC: 16 IU/L (ref 10–60)
ANION GAP SERPL CALCULATED.4IONS-SCNC: 10 MMOL/L (ref 6–13)
AST SERPL W P-5'-P-CCNC: 15 IU/L (ref 10–42)
BASOPHILS NFR BLD AUTO: 0 10^3/UL (ref 0–0.1)
BASOPHILS NFR BLD AUTO: 0.4 %
BILIRUB BLD-MCNC: 0.8 MG/DL (ref 0.2–1)
BUN SERPL-MCNC: 22 MG/DL (ref 6–20)
CALCIUM UR-MCNC: 9.4 MG/DL (ref 8.5–10.3)
CHLORIDE SERPL-SCNC: 99 MMOL/L (ref 101–111)
CO2 SERPL-SCNC: 25 MMOL/L (ref 21–32)
CREAT SERPLBLD-SCNC: 1 MG/DL (ref 0.4–1)
EOSINOPHIL # BLD AUTO: 0.3 10^3/UL (ref 0–0.7)
EOSINOPHIL NFR BLD AUTO: 2.7 %
ERYTHROCYTE [DISTWIDTH] IN BLOOD BY AUTOMATED COUNT: 12.9 % (ref 12–15)
GFRSERPLBLD MDRD-ARVRAT: 54 ML/MIN/{1.73_M2} (ref 89–?)
GLOBULIN SER-MCNC: 3.3 G/DL (ref 2.1–4.2)
GLUCOSE SERPL-MCNC: 201 MG/DL (ref 70–100)
HGB UR QL STRIP: 12.6 G/DL (ref 12–16)
INR PPP: 1.2 (ref 0.8–1.2)
LIPASE SERPL-CCNC: 20 U/L (ref 22–51)
LYMPHOCYTES # SPEC AUTO: 2.5 10^3/UL (ref 1.5–3.5)
LYMPHOCYTES NFR BLD AUTO: 25.5 %
MCH RBC QN AUTO: 28.7 PG (ref 27–31)
MCHC RBC AUTO-ENTMCNC: 32.6 G/DL (ref 32–36)
MCV RBC AUTO: 88.2 FL (ref 81–99)
MONOCYTES # BLD AUTO: 0.8 10^3/UL (ref 0–1)
MONOCYTES NFR BLD AUTO: 7.8 %
NEUTROPHILS # BLD AUTO: 6.2 10^3/UL (ref 1.5–6.6)
NEUTROPHILS # SNV AUTO: 9.8 X10^3/UL (ref 4.8–10.8)
NEUTROPHILS NFR BLD AUTO: 63.2 %
PDW BLD AUTO: 9.8 FL (ref 7.9–10.8)
PLATELET # BLD: 294 10^3/UL (ref 130–450)
PROT SPEC-MCNC: 6.9 G/DL (ref 6.7–8.2)
PROTHROM ACT/NOR PPP: 13.1 SECS (ref 9.9–12.6)
RBC MAR: 4.39 10^6/UL (ref 4.2–5.4)
SODIUM SERPLBLD-SCNC: 134 MMOL/L (ref 135–145)

## 2020-01-02 PROCEDURE — 85025 COMPLETE CBC W/AUTO DIFF WBC: CPT

## 2020-01-02 PROCEDURE — 80053 COMPREHEN METABOLIC PANEL: CPT

## 2020-01-02 PROCEDURE — 85610 PROTHROMBIN TIME: CPT

## 2020-01-02 PROCEDURE — 83690 ASSAY OF LIPASE: CPT

## 2020-01-02 PROCEDURE — 93005 ELECTROCARDIOGRAM TRACING: CPT

## 2020-01-02 PROCEDURE — 71045 X-RAY EXAM CHEST 1 VIEW: CPT

## 2020-01-02 PROCEDURE — 84484 ASSAY OF TROPONIN QUANT: CPT

## 2020-01-02 PROCEDURE — 36415 COLL VENOUS BLD VENIPUNCTURE: CPT

## 2020-01-02 PROCEDURE — 99284 EMERGENCY DEPT VISIT MOD MDM: CPT

## 2020-01-02 NOTE — XRAY REPORT
Reason:  chest pain

Procedure Date:  01/02/2020   

Accession Number:  [id number] / [id number]                    

Procedure:  XR  - Chest 1 View X-Ray CPT Code:  13417

 

***Final Report***

 

 

FULL RESULT:

 

 

EXAM:

CHEST RADIOGRAPHY 1 VIEW

 

EXAM DATE: 1/2/2020.

 

CLINICAL HISTORY: Chest pain.

 

COMPARISON: PA and lateral chest on 11/21/2017.

 

TECHNIQUE: AP chest at 1120.

 

FINDINGS:

Lungs/Pleura: Normal vasculature. The lungs are clear. No pleural fluid 

or pneumothorax.

 

Mediastinum: Normal cardiac and mediastinal contours. Atherosclerosis of 

the aorta.

 

Bones: Degenerative changes of the spine.

 

IMPRESSION: Normal for age. No change from 11/21/2017.

 

[facility]

## 2020-01-02 NOTE — ED PHYSICIAN DOCUMENTATION
PD HPI CHEST PAIN





- Stated complaint


Stated Complaint: CHEST PX





- Chief complaint


Chief Complaint: Cardiac





- History obtained from


History obtained from: Patient (74-year-old woman with history of diabetes but 

no known coronary disease presents with burning chest pain that lasted all day 

yesterday and through the night and then resolved with nitroglycerin just prior 

to arrival.  She is pain-free now.  There is no radiation to the pain.  No 

shortness of breath, nausea.  Her legs were swollen last night but are now.)





Review of Systems


Ten Systems: 10 systems reviewed and negative


Constitutional: denies: Fever, Chills, Fatigue


Nose: denies: Rhinorrhea / runny nose, Congestion


Cardiac: denies: Palpitations, Calf pain


Respiratory: denies: Cough, Hemoptysis, Wheezing





PD PAST MEDICAL HISTORY





- Past Medical History


Past Medical History: Yes


Cardiovascular: Hypertension, High cholesterol


Respiratory: Shortness of breath


Neuro: None


Endocrine/Autoimmune: Type 2 diabetes


GI: None, Colon polyps


: Incontinence


HEENT: Chronic vision loss, Other


Psych: Depression


Musculoskeletal: Osteoarthritis, Gout


Derm: Other





- Past Surgical History


Past Surgical History: Yes


General: Cholecystectomy, Appendectomy, Other





- Present Medications


Home Medications: 


                                Ambulatory Orders











 Medication  Instructions  Recorded  Confirmed


 


Atenolol 50 mg PO DAILY 03/14/14 07/30/19


 


Insulin Aspart (Vial) [NovoLOG] 15 units SUBQ TIDWM 03/14/14 07/30/19


 


Gabapentin 300 mg PO QPM 12/31/15 07/30/19


 


Losartan [Cozaar] 50 mg PO DAILY 12/31/15 07/30/19


 


Simvastatin 20 mg PO QPM 12/31/15 07/30/19


 


Spironolactone 25 mg PO DAILY 12/31/15 07/30/19


 


allopurinoL [Allopurinol] 150 mg PO DAILY 12/31/15 07/30/19


 


Insulin Glargine [Lantus Solostar] 28 units SQ BID 05/31/16 07/30/19


 


Magnesium Oxide [Magnesium] 400 mg PO QPM 07/15/19 07/15/19


 


Thiamine HCl [Vitamin B-1] 100 mg PO DAILY 07/15/19 07/15/19


 


Tolterodine Tartrate [Tolterodine 4 mg PO DAILY 07/15/19 07/15/19





Tartrate ER]   


 


Triamcinolone 0.1% Cream [Kenalog 1 applic TOP BID 07/15/19 07/15/19





0.1% Cream]   


 


Hydrocodone/Acetaminophen 1 each PO Q4HR PRN #20 tablet 07/30/19 





[Hydrocodone-Acetamin 5-325 mg]   














- Allergies


Allergies/Adverse Reactions: 


                                    Allergies











Allergy/AdvReac Type Severity Reaction Status Date / Time


 


succinylcholine Allergy Unknown Respiratory Verified 01/02/20 10:52


 


bacitracin AdvReac Unknown Rash Verified 01/02/20 10:52





[From Neosporin     





([id number])]     


 


bacitracin zinc * AdvReac Unknown Rash Verified 01/02/20 10:52





[From Neosporin     





([id number])]     


 


diphenhydramine HCl * AdvReac Unknown Edema Verified 01/02/20 10:52





[From Benadryl]     


 


neomycin sulfate * AdvReac Unknown Rash Verified 01/02/20 10:52





[From Neosporin     





([id number])]     


 


polymyxin B AdvReac Unknown Rash Verified 01/02/20 10:52





[From Neosporin     





([id number])]     


 


Sulfa (Sulfonamide AdvReac Unknown Rash Verified 01/02/20 10:52





Antibiotics)     














- Social History


Does the pt smoke?: No


Smoking Status: Never smoker


Does the pt drink ETOH?: No


Does the pt have substance abuse?: No





- Immunizations


Immunizations are current?: Yes





- POLST


Patient has POLST: No





PD ED PE NORMAL





- Vitals


Vital signs reviewed: Yes





- General


General: Alert and oriented X 3, No acute distress





- HEENT


HEENT: PERRL, EOMI





- Neck


Neck: Supple, no meningeal sign, No bony TTP





- Cardiac


Cardiac: RRR, No murmur





- Respiratory


Respiratory: No respiratory distress, Clear bilaterally





- Abdomen


Abdomen: Normal bowel sounds, Soft, Non tender





- Back


Back: No CVA TTP, No spinal TTP





- Derm


Derm: Normal color, Warm and dry





- Extremities


Extremities: No edema, No calf tenderness / cord





- Neuro


Neuro: Alert and oriented X 3, Normal speech





Results





- Vitals


Vitals: 


                               Vital Signs - 24 hr











  01/02/20 01/02/20





  10:52 10:55


 


Temperature 36.5 C 


 


Heart Rate 58 L 58 L


 


Respiratory 14 14





Rate  


 


Blood Pressure 163/98 H 163/98 H


 


O2 Saturation 97 97








                                     Oxygen











O2 Source                      Room air

















- EKG (time done)


  ** 1053


Rate: Rate (enter#) (54)


Rhythm: NSR


Axis: Normal


Intervals: Prolonged NC


QRS: Normal


Ischemia: Normal ST segments.  No: ST elevation c/w ischemia, ST depression


Computer interpretation: Agree with computer





- Labs


Labs: 


                                Laboratory Tests











  01/02/20 01/02/20 01/02/20





  11:20 11:20 11:20


 


WBC  9.8  


 


RBC  4.39  


 


Hgb  12.6  


 


Hct  38.7  


 


MCV  88.2  


 


MCH  28.7  


 


MCHC  32.6  


 


RDW  12.9  


 


Plt Count  294  


 


MPV  9.8  


 


Neut # (Auto)  6.2  


 


Lymph # (Auto)  2.5  


 


Mono # (Auto)  0.8  


 


Eos # (Auto)  0.3  


 


Baso # (Auto)  0.0  


 


Absolute Nucleated RBC  0.00  


 


Nucleated RBC %  0.0  


 


PT   13.1 H 


 


INR   1.2 


 


Sodium    134 L


 


Potassium    4.3


 


Chloride    99 L


 


Carbon Dioxide    25


 


Anion Gap    10.0


 


BUN    22 H


 


Creatinine    1.0


 


Estimated GFR (MDRD)    54 L


 


Glucose    201 H


 


Calcium    9.4


 


Total Bilirubin    0.8


 


AST    15


 


ALT    16


 


Alkaline Phosphatase    67


 


Troponin I High Sens   


 


Total Protein    6.9


 


Albumin    3.6


 


Globulin    3.3


 


Albumin/Globulin Ratio    1.1


 


Lipase    20 L














  01/02/20 01/02/20





  11:20 13:26


 


WBC  


 


RBC  


 


Hgb  


 


Hct  


 


MCV  


 


MCH  


 


MCHC  


 


RDW  


 


Plt Count  


 


MPV  


 


Neut # (Auto)  


 


Lymph # (Auto)  


 


Mono # (Auto)  


 


Eos # (Auto)  


 


Baso # (Auto)  


 


Absolute Nucleated RBC  


 


Nucleated RBC %  


 


PT  


 


INR  


 


Sodium  


 


Potassium  


 


Chloride  


 


Carbon Dioxide  


 


Anion Gap  


 


BUN  


 


Creatinine  


 


Estimated GFR (MDRD)  


 


Glucose  


 


Calcium  


 


Total Bilirubin  


 


AST  


 


ALT  


 


Alkaline Phosphatase  


 


Troponin I High Sens  4.3  4.1


 


Total Protein  


 


Albumin  


 


Globulin  


 


Albumin/Globulin Ratio  


 


Lipase  














- Rads (name of study)


  ** 1v chest


Radiology: EMP read contemporaneously (normal)





PD MEDICAL DECISION MAKING





- ED course


ED course: 





74-year-old with resolved atypical chest pain that lasted the whole day with 2- 

troponins and a nonischemic EKG.  She is safe for outpatient follow-up, signs 

and symptoms that would necessitate urgent reevaluation were discussed.





Departure





- Departure


Disposition: 01 Home, Self Care


Clinical Impression: 


 Atypical chest pain





Condition: Good


Record reviewed to determine appropriate education?: Yes


Instructions:  ED Chest Pain Atypical Unkn Cause


Comments: 


Follow-up with your doctor, next available appointment.  Consider stress 

testing.  Return for any new or recurrent chest pains or other new issues.

## 2020-01-11 ENCOUNTER — HOSPITAL ENCOUNTER (EMERGENCY)
Dept: HOSPITAL 76 - ED | Age: 75
Discharge: HOME | End: 2020-01-11
Payer: MEDICARE

## 2020-01-11 VITALS — SYSTOLIC BLOOD PRESSURE: 150 MMHG | DIASTOLIC BLOOD PRESSURE: 72 MMHG

## 2020-01-11 DIAGNOSIS — I44.0: ICD-10-CM

## 2020-01-11 DIAGNOSIS — I10: ICD-10-CM

## 2020-01-11 DIAGNOSIS — Z79.4: ICD-10-CM

## 2020-01-11 DIAGNOSIS — E86.0: ICD-10-CM

## 2020-01-11 DIAGNOSIS — E11.65: ICD-10-CM

## 2020-01-11 DIAGNOSIS — R07.89: Primary | ICD-10-CM

## 2020-01-11 LAB
ALBUMIN DIAFP-MCNC: 3.9 G/DL (ref 3.2–5.5)
ALBUMIN/GLOB SERPL: 1.1 {RATIO} (ref 1–2.2)
ALP SERPL-CCNC: 71 IU/L (ref 42–121)
ALT SERPL W P-5'-P-CCNC: 16 IU/L (ref 10–60)
ANION GAP SERPL CALCULATED.4IONS-SCNC: 12 MMOL/L (ref 6–13)
AST SERPL W P-5'-P-CCNC: 21 IU/L (ref 10–42)
BASOPHILS NFR BLD AUTO: 0.1 10^3/UL (ref 0–0.1)
BASOPHILS NFR BLD AUTO: 0.5 %
BILIRUB BLD-MCNC: 1 MG/DL (ref 0.2–1)
BUN SERPL-MCNC: 22 MG/DL (ref 6–20)
CALCIUM UR-MCNC: 10.1 MG/DL (ref 8.5–10.3)
CHLORIDE SERPL-SCNC: 104 MMOL/L (ref 101–111)
CLARITY UR REFRACT.AUTO: CLEAR
CO2 SERPL-SCNC: 19 MMOL/L (ref 21–32)
CREAT SERPLBLD-SCNC: 1.2 MG/DL (ref 0.4–1)
EOSINOPHIL # BLD AUTO: 0.2 10^3/UL (ref 0–0.7)
EOSINOPHIL NFR BLD AUTO: 2.4 %
ERYTHROCYTE [DISTWIDTH] IN BLOOD BY AUTOMATED COUNT: 12.8 % (ref 12–15)
GFRSERPLBLD MDRD-ARVRAT: 44 ML/MIN/{1.73_M2} (ref 89–?)
GLOBULIN SER-MCNC: 3.4 G/DL (ref 2.1–4.2)
GLUCOSE SERPL-MCNC: 255 MG/DL (ref 70–100)
GLUCOSE UR QL STRIP.AUTO: NEGATIVE MG/DL
HGB UR QL STRIP: 13.4 G/DL (ref 12–16)
KETONES UR QL STRIP.AUTO: NEGATIVE MG/DL
LIPASE SERPL-CCNC: 19 U/L (ref 22–51)
LYMPHOCYTES # SPEC AUTO: 2.8 10^3/UL (ref 1.5–3.5)
LYMPHOCYTES NFR BLD AUTO: 28.2 %
MCH RBC QN AUTO: 28.8 PG (ref 27–31)
MCHC RBC AUTO-ENTMCNC: 33.3 G/DL (ref 32–36)
MCV RBC AUTO: 86.3 FL (ref 81–99)
MONOCYTES # BLD AUTO: 0.7 10^3/UL (ref 0–1)
MONOCYTES NFR BLD AUTO: 6.5 %
NEUTROPHILS # BLD AUTO: 6.2 10^3/UL (ref 1.5–6.6)
NEUTROPHILS # SNV AUTO: 10.1 X10^3/UL (ref 4.8–10.8)
NEUTROPHILS NFR BLD AUTO: 62 %
NITRITE UR QL STRIP.AUTO: NEGATIVE
PDW BLD AUTO: 10.9 FL (ref 7.9–10.8)
PH UR STRIP.AUTO: 8.5 PH (ref 5–7.5)
PLATELET # BLD: 276 10^3/UL (ref 130–450)
PROT SPEC-MCNC: 7.3 G/DL (ref 6.7–8.2)
PROT UR STRIP.AUTO-MCNC: NEGATIVE MG/DL
RBC # UR STRIP.AUTO: NEGATIVE /UL
RBC MAR: 4.66 10^6/UL (ref 4.2–5.4)
SODIUM SERPLBLD-SCNC: 135 MMOL/L (ref 135–145)
SP GR UR STRIP.AUTO: 1.01 (ref 1–1.03)
UROBILINOGEN UR QL STRIP.AUTO: (no result) E.U./DL
UROBILINOGEN UR STRIP.AUTO-MCNC: NEGATIVE MG/DL

## 2020-01-11 PROCEDURE — 87086 URINE CULTURE/COLONY COUNT: CPT

## 2020-01-11 PROCEDURE — 71046 X-RAY EXAM CHEST 2 VIEWS: CPT

## 2020-01-11 PROCEDURE — 96360 HYDRATION IV INFUSION INIT: CPT

## 2020-01-11 PROCEDURE — 84484 ASSAY OF TROPONIN QUANT: CPT

## 2020-01-11 PROCEDURE — 85025 COMPLETE CBC W/AUTO DIFF WBC: CPT

## 2020-01-11 PROCEDURE — 81003 URINALYSIS AUTO W/O SCOPE: CPT

## 2020-01-11 PROCEDURE — 83690 ASSAY OF LIPASE: CPT

## 2020-01-11 PROCEDURE — 36415 COLL VENOUS BLD VENIPUNCTURE: CPT

## 2020-01-11 PROCEDURE — 81001 URINALYSIS AUTO W/SCOPE: CPT

## 2020-01-11 PROCEDURE — 99284 EMERGENCY DEPT VISIT MOD MDM: CPT

## 2020-01-11 PROCEDURE — 93005 ELECTROCARDIOGRAM TRACING: CPT

## 2020-01-11 PROCEDURE — 80053 COMPREHEN METABOLIC PANEL: CPT

## 2020-01-11 NOTE — ED PHYSICIAN DOCUMENTATION
PD HPI CHEST PAIN





- Stated complaint


Stated Complaint: CHEST PX,WEAKNESS





- Chief complaint


Chief Complaint: Cardiac





- History obtained from


History obtained from: Patient





- History of Present Illness


Timing - onset: Today (0400)


Timing - onset during: Rest


Timing - duration: Hours


Timing - details: Gradual onset, Still present, Waxing and waning


Quality: Pressure, Tightness


Location: Substernal, Right chest


Radiation: No: Jaw, Neck, Back


Improved by: Rest


Worsened by: Movement, Palpation


Associated symptoms: Shortness of air, Feeling faint / dizzy, Cough


Similar symptoms before: Diagnosis (atypical chest pain)


Recently seen: Emergency Dept





- Additional information


Additional information: 





74-year-old female with a history of diabetes who is on insulin has developed 

chest pressure and tingling early this morning while in bed.  She got up and 

started to clean up her [employer] stuff felt a bit short of breath and weak.  

She is come to the emergency department this morning for reevaluation.  She was 

seen here 2 weeks ago with similar complaints.





Review of Systems


Constitutional: denies: Fever


Eyes: denies: Decreased vision


Ears: denies: Ear pain


Nose: denies: Rhinorrhea / runny nose, Congestion


Throat: denies: Sore throat


Cardiac: reports: Chest pain / pressure.  denies: Palpitations, Pedal edema, 

Calf pain


Respiratory: reports: Dyspnea, Cough


GI: denies: Abdominal Pain, Nausea, Vomiting


: denies: Dysuria





PD PAST MEDICAL HISTORY





- Past Medical History


Cardiovascular: Hypertension, High cholesterol


Respiratory: Shortness of breath


Neuro: None


Endocrine/Autoimmune: Type 2 diabetes


GI: None, Colon polyps


: Incontinence


HEENT: Chronic vision loss, Other


Psych: Depression


Musculoskeletal: Osteoarthritis, Gout


Derm: Other





- Past Surgical History


Past Surgical History: Yes


General: Cholecystectomy, Appendectomy, Other





- Present Medications


Home Medications: 


                                Ambulatory Orders











 Medication  Instructions  Recorded  Confirmed


 


Atenolol 50 mg PO DAILY 03/14/14 07/30/19


 


Insulin Aspart (Vial) [NovoLOG] 15 units SUBQ TIDWM 03/14/14 07/30/19


 


Gabapentin 300 mg PO QPM 12/31/15 07/30/19


 


Losartan [Cozaar] 50 mg PO DAILY 12/31/15 07/30/19


 


Simvastatin 20 mg PO QPM 12/31/15 07/30/19


 


Spironolactone 25 mg PO DAILY 12/31/15 07/30/19


 


allopurinoL [Allopurinol] 150 mg PO DAILY 12/31/15 07/30/19


 


Insulin Glargine [Lantus Solostar] 28 units SQ BID 05/31/16 07/30/19


 


Magnesium Oxide [Magnesium] 400 mg PO QPM 07/15/19 07/15/19


 


Thiamine HCl [Vitamin B-1] 100 mg PO DAILY 07/15/19 07/15/19


 


Tolterodine Tartrate [Tolterodine 4 mg PO DAILY 07/15/19 07/15/19





Tartrate ER]   


 


Triamcinolone 0.1% Cream [Kenalog 1 applic TOP BID 07/15/19 07/15/19





0.1% Cream]   


 


Hydrocodone/Acetaminophen 1 each PO Q4HR PRN #20 tablet 07/30/19 





[Hydrocodone-Acetamin 5-325 mg]   














- Allergies


Allergies/Adverse Reactions: 


                                    Allergies











Allergy/AdvReac Type Severity Reaction Status Date / Time


 


succinylcholine Allergy Unknown Respiratory Verified 01/02/20 10:52


 


losartan [From Cozaar] Allergy  Unknown Verified 01/03/20 08:16


 


tramadol Allergy  Unknown Verified 01/03/20 08:16


 


bacitracin AdvReac Unknown Rash Verified 01/02/20 10:52





[From Neosporin     





([id number])]     


 


bacitracin zinc * AdvReac Unknown Rash Verified 01/02/20 10:52





[From Neosporin     





([id number])]     


 


diphenhydramine HCl * AdvReac Unknown Edema Verified 01/02/20 10:52





[From Benadryl]     


 


neomycin sulfate * AdvReac Unknown Rash Verified 01/02/20 10:52





[From Neosporin     





([id number])]     


 


polymyxin B AdvReac Unknown Rash Verified 01/02/20 10:52





[From Neosporin     





([id number])]     


 


Sulfa (Sulfonamide AdvReac Unknown Rash Verified 01/02/20 10:52





Antibiotics)     














- Social History


Does the pt smoke?: No


Smoking Status: Never smoker


Does the pt drink ETOH?: No


Does the pt have substance abuse?: No





- Immunizations


Immunizations are current?: Yes





- POLST


Patient has POLST: No





PD ED PE NORMAL





- Vitals


Vital signs reviewed: Yes (hypertensive )





- General


General: Alert and oriented X 3, No acute distress, Well developed/nourished





- HEENT


HEENT: Atraumatic, PERRL, EOMI, Other (dry mucous membranes )





- Neck


Neck: Supple, no meningeal sign, No bony TTP





- Cardiac


Cardiac: RRR, No murmur





- Respiratory


Respiratory: No respiratory distress, Clear bilaterally, Other (There is chest 

wall tenderness to the right para sternal area that reproduces the symptoms the 

patient is having. )





- Abdomen


Abdomen: Soft, Non tender





- Back


Back: No CVA TTP, No spinal TTP





- Derm


Derm: Normal color, Warm and dry, No rash





- Extremities


Extremities: No deformity, No edema, No calf tenderness / cord





- Neuro


Neuro: Alert and oriented X 3, CNs 2-12 intact, No motor deficit, No sensory 

deficit, Normal speech


Eye Opening: Spontaneous


Motor: Obeys Commands


Verbal: Oriented


GCS Score: 15





- Psych


Psych: Normal mood, Normal affect





Results





- Vitals


Vitals: 


                               Vital Signs - 24 hr











  01/11/20 01/11/20 01/11/20





  09:53 11:16 12:08


 


Temperature 36.5 C  


 


Heart Rate 68 63 67


 


Respiratory 18 18 18





Rate   


 


Blood Pressure 147/97 H 129/83 H 123/51 L


 


O2 Saturation 100 99 99














  01/11/20





  12:50


 


Temperature 


 


Heart Rate 67


 


Respiratory 18





Rate 


 


Blood Pressure 148/69 H


 


O2 Saturation 98








                                     Oxygen











O2 Source                      Room air

















- EKG (time done)


  ** 0952


Rate: Rate (enter#) (65)


Rhythm: NSR


Intervals: Prolonged SD


Compare to prior EKG: Unchanged from prior EKG (SPT 1-2-2020 no changes)


Computer interpretation: Agree with computer





- Labs


Labs: 


                                Laboratory Tests











  01/11/20 01/11/20 01/11/20





  10:20 10:25 10:25


 


WBC   10.1 


 


RBC   4.66 


 


Hgb   13.4 


 


Hct   40.2 


 


MCV   86.3 


 


MCH   28.8 


 


MCHC   33.3 


 


RDW   12.8 


 


Plt Count   276 


 


MPV   10.9 H 


 


Neut # (Auto)   6.2 


 


Lymph # (Auto)   2.8 


 


Mono # (Auto)   0.7 


 


Eos # (Auto)   0.2 


 


Baso # (Auto)   0.1 


 


Absolute Nucleated RBC   0.00 


 


Nucleated RBC %   0.0 


 


Sodium    135


 


Potassium    4.4


 


Chloride    104


 


Carbon Dioxide    19 L


 


Anion Gap    12.0


 


BUN    22 H


 


Creatinine    1.2 H


 


Estimated GFR (MDRD)    44 L


 


Glucose    255 H


 


Calcium    10.1


 


Total Bilirubin    1.0


 


AST    21


 


ALT    16


 


Alkaline Phosphatase    71


 


Troponin I High Sens   


 


Total Protein    7.3


 


Albumin    3.9


 


Globulin    3.4


 


Albumin/Globulin Ratio    1.1


 


Lipase    19 L


 


Urine Color  YELLOW  


 


Urine Clarity  CLEAR  


 


Urine pH  8.5 H  


 


Ur Specific Gravity  1.010  


 


Urine Protein  NEGATIVE  


 


Urine Glucose (UA)  NEGATIVE  


 


Urine Ketones  NEGATIVE  


 


Urine Occult Blood  NEGATIVE  


 


Urine Nitrite  NEGATIVE  


 


Urine Bilirubin  NEGATIVE  


 


Urine Urobilinogen  0.2 (NORMAL)  


 


Ur Leukocyte Esterase  NEGATIVE  


 


Ur Microscopic Review  NOT INDICATED  


 


Urine Culture Comments  NOT INDICATED  














  01/11/20





  10:25


 


WBC 


 


RBC 


 


Hgb 


 


Hct 


 


MCV 


 


MCH 


 


MCHC 


 


RDW 


 


Plt Count 


 


MPV 


 


Neut # (Auto) 


 


Lymph # (Auto) 


 


Mono # (Auto) 


 


Eos # (Auto) 


 


Baso # (Auto) 


 


Absolute Nucleated RBC 


 


Nucleated RBC % 


 


Sodium 


 


Potassium 


 


Chloride 


 


Carbon Dioxide 


 


Anion Gap 


 


BUN 


 


Creatinine 


 


Estimated GFR (MDRD) 


 


Glucose 


 


Calcium 


 


Total Bilirubin 


 


AST 


 


ALT 


 


Alkaline Phosphatase 


 


Troponin I High Sens  4.0


 


Total Protein 


 


Albumin 


 


Globulin 


 


Albumin/Globulin Ratio 


 


Lipase 


 


Urine Color 


 


Urine Clarity 


 


Urine pH 


 


Ur Specific Gravity 


 


Urine Protein 


 


Urine Glucose (UA) 


 


Urine Ketones 


 


Urine Occult Blood 


 


Urine Nitrite 


 


Urine Bilirubin 


 


Urine Urobilinogen 


 


Ur Leukocyte Esterase 


 


Ur Microscopic Review 


 


Urine Culture Comments 














- Rads (name of study)


  ** chest 2 view


Radiology: Prelim report reviewed (Impression: No convincing acute 

cardiopulmonary abnormality.), EMP read indepedently, See rad report





Procedures





- IVC sono (time)


  ** 1038


Bedside IVC sono: IVC measures (cm) (0.79), IVC collapsed c insp (cm) 

(complete), Dehydration (est 2 liter deficit.)





PD MEDICAL DECISION MAKING





- ED course


Complexity details: considered differential, d/w patient, d/w family


ED course: 





74-year-old diabetic female with74-year-old diabetic female with chest pain has 

tenderness to the chest wall she is found to be dehydrated on interrogation the 

inferior vena cava and she is administered saline.





Departure





- Departure


Disposition: 01 Home, Self Care


Clinical Impression: 


 Hyperglycemia, Dehydration determined by examination, Chest wall pain





Condition: Stable


Instructions:  ED Dehydration, ED Hyperglycemia Diabetic, ED Strain Chest Wall, 

ED Chest Pain Costochondritis


Follow-Up: 


Heaven Alonzo DO [Primary Care Provider] -

## 2020-01-14 ENCOUNTER — HOSPITAL ENCOUNTER (OUTPATIENT)
Dept: HOSPITAL 76 - LAB.WCP | Age: 75
Discharge: HOME | End: 2020-01-14
Attending: FAMILY MEDICINE
Payer: MEDICARE

## 2020-01-14 DIAGNOSIS — E11.65: Primary | ICD-10-CM

## 2020-01-14 LAB
CHOLEST SERPL-MCNC: 150 MG/DL
EST. AVERAGE GLUCOSE BLD GHB EST-MCNC: 240 MG/DL (ref 70–100)
HB2 TOTAL: 13.3 G/DL
HBA1C BLD-MCNC: 1.14 G/DL
HDLC SERPL-MCNC: 38 MG/DL
HDLC SERPL: 3.9 {RATIO} (ref ?–4.4)
HEMOGLOBIN A1C %: 10 % (ref 4.6–6.2)
LDLC SERPL CALC-MCNC: 87 MG/DL
LDLC/HDLC SERPL: 2.3 {RATIO} (ref ?–4.4)
VLDLC SERPL-SCNC: 25 MG/DL

## 2020-01-14 PROCEDURE — 83721 ASSAY OF BLOOD LIPOPROTEIN: CPT

## 2020-01-14 PROCEDURE — 80061 LIPID PANEL: CPT

## 2020-01-14 PROCEDURE — 83036 HEMOGLOBIN GLYCOSYLATED A1C: CPT

## 2020-01-14 PROCEDURE — 36415 COLL VENOUS BLD VENIPUNCTURE: CPT

## 2020-04-02 ENCOUNTER — HOSPITAL ENCOUNTER (OUTPATIENT)
Dept: HOSPITAL 76 - DI | Age: 75
Discharge: HOME | End: 2020-04-02
Attending: FAMILY MEDICINE
Payer: MEDICARE

## 2020-04-02 DIAGNOSIS — I10: ICD-10-CM

## 2020-04-02 DIAGNOSIS — E11.9: ICD-10-CM

## 2020-04-02 DIAGNOSIS — R07.9: Primary | ICD-10-CM

## 2020-04-02 DIAGNOSIS — R94.31: ICD-10-CM

## 2020-04-02 DIAGNOSIS — E78.5: ICD-10-CM

## 2020-04-02 DIAGNOSIS — Z82.49: ICD-10-CM

## 2020-04-02 PROCEDURE — 93017 CV STRESS TEST TRACING ONLY: CPT

## 2020-04-02 PROCEDURE — 78452 HT MUSCLE IMAGE SPECT MULT: CPT

## 2020-04-05 NOTE — NUCLEAR MEDICINE REPORT
Reason:  CHEST PAIN

Procedure Date:  04/02/2020   

Accession Number:  [id number] / [id number]                    

Procedure:  NM  - Myocardial Perfusion STR/RST CPT Code:  

 

***Final Report***

 

 

FULL RESULT:

 

 

EXAM:

SINGLE-ISOTOPE PHARMACOLOGICAL STRESS TEST WITH REGADENOSON. 

SINGLE-ISOTOPE AND ONE-DAY REST/STRESS MYOCARDIAL PERFUSION SCANS WITH 

TOMOGRAPHIC IMAGING, QUANTITATIVE ANALYSIS, WALL MOTION ANALYSIS AND 

CALCULATION OF EJECTION FRACTION.

 

EXAM DATE: 4/2/2020 01:45 PM.

 

CLINICAL HISTORY: CHEST PAIN.

 

COMPARISON: None.

 

TECHNIQUE:

After the intravenous administration of 10.2 mCi of Tc-99m sestamibi, a 

rest myocardial perfusion scan was done with tomography. Motion 

correction was applied when appropriate.

 

After an appropriate delay, pharmacological stress was performed with the 

infusion of 0.4 mg regadenoson per protocol. According to protocol, 43.0 

mCi of Tc-99m sestamibi was injected for stress myocardial perfusion 

scan. Motion correction was applied when appropriate.

Gated tomographic images were obtained for wall motion analysis and 

computation of left ventricular ejection fraction.

 

FINDINGS:

Perfusion images:

Left ventricular chamber size appears normal at rest and mildly increased 

at stress.

Evaluation of the inferior wall is degraded by adjacent visceral activity 

somewhat more pronounced on stress images.

No convincing fixed perfusion deficits.

Moderate size, mild severity reversible perfusion deficit involving the 

apical third anterior wall and additional moderate sized, mild severity 

perfusion deficit at the mid to basal lateral wall.

SSS 11, SRS 3, SDS 8.

 

Gated images:

No convincing focal wall motion abnormality.

Calculated left ventricular EDV 52 mL, ESV 5 mL.

The left ventricular ejection fraction is estimated at 90% (normal > 50%).

IMPRESSION:

1. Moderate size, mild severity reversible perfusion deficits involving 

the apical third anterior wall and mid to basal lateral wall, may reflect 

stress-induced ischemia..

2. No convincing fixed perfusion deficits.

3. Left ventricular ejection fraction of 90% (normal > 50%).

 

Please correlate findings with stress ECG tracings and procedure notes.

 

[facility]

## 2020-04-06 NOTE — CARDIAC PROCEDURE NOTE
DATE OF SERVICE: 04/02/2020

Physician: Liana Kaminski MD, [facility]

 

INDICATION:  Chest pain.

 

CARDIAC RISK FACTORS:  Postmenopausal status, hypertension, diabetes, 
hyperlipidemia, family history of heart disease.

 

DESCRIPTION OF PROCEDURE:  After signing informed consent, the patient underwent
a Lexiscan pharmaceutical stress test with nuclear myocardial perfusion imaging.
  She had no chest pain.  The patient developed brief flushing and shortness of 
breath, oxygen saturation remained 95% to 98% on room air throughout the test.  
The symptoms resolved in less than a minute spontaneously.

 

RESTING ELECTROCARDIOGRAM:  Normal sinus rhythm, first-degree AV block, early 
R/S transition.

 

ELECTROCARDIOGRAM AT PEAK:  No new ST segment or T-wave changes.

 

Resting heart rate:  71.  Peak heart rate:  118.

 

Resting blood pressure;  145/78.  Peak blood pressure:  129/69.

 

SUMMARY

1.  Abnormal resting electrocardiogram.

2.  No ischemic changes by EKG criteria during this pharmaceutical stress test.

3.  Nuclear images reported separately.

4.  This patient's cardiac risk based on all the above:  Moderate.

5.  This stress test was dictated on 04/02/2020, but not transcribed then, since
it was lost by the transcription service.

 

 

cc: Heaven Alonzo DO

DD: 04/06/2020 16:20

TD: 04/06/2020 16:22

Job #: [id number]

MTDD

## 2020-04-22 ENCOUNTER — HOSPITAL ENCOUNTER (EMERGENCY)
Dept: HOSPITAL 76 - ED | Age: 75
Discharge: HOME | End: 2020-04-22
Payer: MEDICARE

## 2020-04-22 ENCOUNTER — HOSPITAL ENCOUNTER (OUTPATIENT)
Dept: HOSPITAL 76 - EMS | Age: 75
Discharge: ANOTHER HOSPITAL | End: 2020-04-22
Attending: SURGERY
Payer: MEDICARE

## 2020-04-22 VITALS — DIASTOLIC BLOOD PRESSURE: 87 MMHG | SYSTOLIC BLOOD PRESSURE: 150 MMHG

## 2020-04-22 DIAGNOSIS — I10: ICD-10-CM

## 2020-04-22 DIAGNOSIS — H54.7: ICD-10-CM

## 2020-04-22 DIAGNOSIS — Z79.4: ICD-10-CM

## 2020-04-22 DIAGNOSIS — R73.09: ICD-10-CM

## 2020-04-22 DIAGNOSIS — E11.649: Primary | ICD-10-CM

## 2020-04-22 DIAGNOSIS — R06.02: ICD-10-CM

## 2020-04-22 DIAGNOSIS — E78.00: ICD-10-CM

## 2020-04-22 DIAGNOSIS — R61: ICD-10-CM

## 2020-04-22 DIAGNOSIS — R53.1: Primary | ICD-10-CM

## 2020-04-22 LAB
ALBUMIN DIAFP-MCNC: 3.8 G/DL (ref 3.2–5.5)
ALBUMIN/GLOB SERPL: 1.2 {RATIO} (ref 1–2.2)
ALP SERPL-CCNC: 67 IU/L (ref 42–121)
ALT SERPL W P-5'-P-CCNC: 19 IU/L (ref 10–60)
ANION GAP SERPL CALCULATED.4IONS-SCNC: 10 MMOL/L (ref 6–13)
AST SERPL W P-5'-P-CCNC: 21 IU/L (ref 10–42)
BASOPHILS NFR BLD AUTO: 0 10^3/UL (ref 0–0.1)
BASOPHILS NFR BLD AUTO: 0.4 %
BILIRUB BLD-MCNC: 0.9 MG/DL (ref 0.2–1)
BUN SERPL-MCNC: 29 MG/DL (ref 6–20)
CALCIUM UR-MCNC: 9.4 MG/DL (ref 8.5–10.3)
CHLORIDE SERPL-SCNC: 106 MMOL/L (ref 101–111)
CLARITY UR REFRACT.AUTO: CLEAR
CO2 SERPL-SCNC: 20 MMOL/L (ref 21–32)
CREAT SERPLBLD-SCNC: 1.1 MG/DL (ref 0.4–1)
EOSINOPHIL # BLD AUTO: 0.2 10^3/UL (ref 0–0.7)
EOSINOPHIL NFR BLD AUTO: 2.2 %
ERYTHROCYTE [DISTWIDTH] IN BLOOD BY AUTOMATED COUNT: 14.3 % (ref 12–15)
GLOBULIN SER-MCNC: 3.1 G/DL (ref 2.1–4.2)
GLUCOSE SERPL-MCNC: 159 MG/DL (ref 70–100)
GLUCOSE UR QL STRIP.AUTO: NEGATIVE MG/DL
HGB UR QL STRIP: 12.8 G/DL (ref 12–16)
KETONES UR QL STRIP.AUTO: NEGATIVE MG/DL
LIPASE SERPL-CCNC: 21 U/L (ref 22–51)
LYMPHOCYTES # SPEC AUTO: 2 10^3/UL (ref 1.5–3.5)
LYMPHOCYTES NFR BLD AUTO: 19.6 %
MCH RBC QN AUTO: 29.3 PG (ref 27–31)
MCHC RBC AUTO-ENTMCNC: 33.2 G/DL (ref 32–36)
MCV RBC AUTO: 88.3 FL (ref 81–99)
MONOCYTES # BLD AUTO: 0.7 10^3/UL (ref 0–1)
MONOCYTES NFR BLD AUTO: 7.2 %
NEUTROPHILS # BLD AUTO: 7.1 10^3/UL (ref 1.5–6.6)
NEUTROPHILS # SNV AUTO: 10.2 X10^3/UL (ref 4.8–10.8)
NEUTROPHILS NFR BLD AUTO: 69.8 %
NITRITE UR QL STRIP.AUTO: NEGATIVE
PDW BLD AUTO: 9.8 FL (ref 7.9–10.8)
PH UR STRIP.AUTO: 6 PH (ref 5–7.5)
PLATELET # BLD: 249 10^3/UL (ref 130–450)
PROT SPEC-MCNC: 6.9 G/DL (ref 6.7–8.2)
PROT UR STRIP.AUTO-MCNC: NEGATIVE MG/DL
RBC # UR STRIP.AUTO: NEGATIVE /UL
RBC # URNS HPF: (no result) /HPF (ref 0–5)
RBC MAR: 4.37 10^6/UL (ref 4.2–5.4)
SODIUM SERPLBLD-SCNC: 136 MMOL/L (ref 135–145)
SP GR UR STRIP.AUTO: 1.01 (ref 1–1.03)
SQUAMOUS URNS QL MICRO: (no result)
UROBILINOGEN UR QL STRIP.AUTO: (no result) E.U./DL
UROBILINOGEN UR STRIP.AUTO-MCNC: NEGATIVE MG/DL

## 2020-04-22 PROCEDURE — 93005 ELECTROCARDIOGRAM TRACING: CPT

## 2020-04-22 PROCEDURE — 36415 COLL VENOUS BLD VENIPUNCTURE: CPT

## 2020-04-22 PROCEDURE — 84484 ASSAY OF TROPONIN QUANT: CPT

## 2020-04-22 PROCEDURE — 99284 EMERGENCY DEPT VISIT MOD MDM: CPT

## 2020-04-22 PROCEDURE — 87086 URINE CULTURE/COLONY COUNT: CPT

## 2020-04-22 PROCEDURE — 83880 ASSAY OF NATRIURETIC PEPTIDE: CPT

## 2020-04-22 PROCEDURE — 83690 ASSAY OF LIPASE: CPT

## 2020-04-22 PROCEDURE — 85025 COMPLETE CBC W/AUTO DIFF WBC: CPT

## 2020-04-22 PROCEDURE — 71045 X-RAY EXAM CHEST 1 VIEW: CPT

## 2020-04-22 PROCEDURE — 81001 URINALYSIS AUTO W/SCOPE: CPT

## 2020-04-22 PROCEDURE — 80053 COMPREHEN METABOLIC PANEL: CPT

## 2020-04-22 NOTE — ED PHYSICIAN DOCUMENTATION
History of Present Illness





- Stated complaint


Stated Complaint: SOA/ LOW BLOOD SUGAR





- Additonal information


Additional information: 





This is a 75-year-old female presents with hypoglycemia.  She has a history of 

diabetes, she is a middle of moving and she states that she did not have a 

glucometer with her at her [facility].  She took her dose of insulin at 

night including 20 units of short acting insulin and 30 units of long-acting 

insulin, and she did this without checking her blood sugar and she woke up 

feeling generalized malaise.  Since she did not have glucometer she called the 

medics who arrived and found her alert and conscious with a blood sugar of 48.  

They gave her dextrose as well as a slice of pizza which she ate, and her blood 

sugar improved to the 200s.  She denies any fever or chills, she has some 

intermittent mild shortness of breath but she states this is baseline for her, 

she denies any pain in her belly, no chest pain. She states when she went to bed

she felt completely normal.





Review of Systems


Constitutional: denies: Fever


Nose: denies: Rhinorrhea / runny nose


Cardiac: denies: Chest pain / pressure


Respiratory: denies: Cough


GI: denies: Abdominal Pain


Skin: denies: Rash


Neurologic: denies: Syncope





PD PAST MEDICAL HISTORY





- Past Medical History


Cardiovascular: Hypertension, High cholesterol


Respiratory: Shortness of breath


Neuro: None


Endocrine/Autoimmune: Type 2 diabetes


GI: None, Colon polyps


: Incontinence


HEENT: Chronic vision loss, Other


Psych: Depression


Musculoskeletal: Osteoarthritis, Gout


Derm: Other





- Past Surgical History


Past Surgical History: Yes


General: Cholecystectomy, Appendectomy, Other





- Present Medications


Home Medications: 


                                Ambulatory Orders











 Medication  Instructions  Recorded  Confirmed


 


Atenolol 50 mg PO DAILY 03/14/14 07/30/19


 


Insulin Aspart (Vial) [NovoLOG] 15 units SUBQ TIDWM 03/14/14 07/30/19


 


Gabapentin 300 mg PO QPM 12/31/15 07/30/19


 


Losartan [Cozaar] 50 mg PO DAILY 12/31/15 07/30/19


 


Simvastatin 20 mg PO QPM 12/31/15 07/30/19


 


Spironolactone 25 mg PO DAILY 12/31/15 07/30/19


 


allopurinoL [Allopurinol] 150 mg PO DAILY 12/31/15 07/30/19


 


Insulin Glargine [Lantus Solostar] 28 units SQ BID 05/31/16 07/30/19


 


Magnesium Oxide [Magnesium] 400 mg PO QPM 07/15/19 07/15/19


 


Thiamine HCl [Vitamin B-1] 100 mg PO DAILY 07/15/19 07/15/19


 


Tolterodine Tartrate [Tolterodine 4 mg PO DAILY 07/15/19 07/15/19





Tartrate ER]   


 


Triamcinolone 0.1% Cream [Kenalog 1 applic TOP BID 07/15/19 07/15/19





0.1% Cream]   


 


Hydrocodone/Acetaminophen 1 each PO Q4HR PRN #20 tablet 07/30/19 





[Hydrocodone-Acetamin 5-325 mg]   














- Allergies


Allergies/Adverse Reactions: 


                                    Allergies











Allergy/AdvReac Type Severity Reaction Status Date / Time


 


succinylcholine Allergy Unknown Respiratory Verified 04/22/20 02:10


 


losartan [From Cozaar] Allergy  Unknown Verified 04/22/20 02:10


 


tramadol Allergy  Unknown Verified 04/22/20 02:10


 


bacitracin AdvReac Unknown Rash Verified 04/22/20 02:10





[From Neosporin     





([id number])]     


 


bacitracin zinc * AdvReac Unknown Rash Verified 04/22/20 02:10





[From Neosporin     





([id number])]     


 


diphenhydramine HCl * AdvReac Unknown Edema Verified 04/22/20 02:10





[From Benadryl]     


 


neomycin sulfate * AdvReac Unknown Rash Verified 04/22/20 02:10





[From Neosporin     





([id number])]     


 


polymyxin B AdvReac Unknown Rash Verified 04/22/20 02:10





[From Neosporin     





([id number])]     


 


Sulfa (Sulfonamide AdvReac Unknown Rash Verified 04/22/20 02:10





Antibiotics)     














- Social History


Does the pt smoke?: No


Smoking Status: Never smoker


Does the pt drink ETOH?: No


Does the pt have substance abuse?: No





- Immunizations


Immunizations are current?: Yes





- POLST


Patient has POLST: No





PD ED PE NORMAL





- Vitals


Vital signs reviewed: Yes





- General


General: Alert and oriented X 3, No acute distress





- HEENT


HEENT: PERRL





- Neck


Neck: Supple, no meningeal sign





- Cardiac


Cardiac: RRR, No murmur





- Respiratory


Respiratory: No respiratory distress, Clear bilaterally





- Abdomen


Abdomen: Normal bowel sounds, Soft, Non tender, Non distended





- Female 


Female : Other (External genitalia normal in appearance)





- Derm


Derm: Warm and dry





- Extremities


Extremities: No deformity





- Neuro


Neuro: Alert and oriented X 3, CNs 2-12 intact, No motor deficit, No sensory 

deficit, Normal speech





- Psych


Psych: Normal mood, Normal affect





Results





- Vitals


Vitals: 


                               Vital Signs - 24 hr











  04/22/20 04/22/20 04/22/20





  02:00 02:15 02:48


 


Temperature 34.7 C L  36.4 C L


 


Heart Rate 64 66 67


 


Respiratory 17 25 H 20





Rate   


 


Blood Pressure 149/110 H 165/48 H 155/65 H


 


O2 Saturation 100 100 100














  04/22/20





  03:36


 


Temperature 


 


Heart Rate 70


 


Respiratory 16





Rate 


 


Blood Pressure 150/87 H


 


O2 Saturation 96








                                     Oxygen











O2 Source                      Room air

















- EKG (time done)


  ** 2:17


Other comments: Other comments (Rate 65, rhythm sinus, there is a first-degree 

AV block, there is no ST segment elevation or depression, no abnormal T wave 

inversions.  QTc 449)





- Labs


Labs: 


                                Laboratory Tests











  04/22/20 04/22/20 04/22/20





  02:26 02:26 02:26


 


WBC  10.2  


 


RBC  4.37  


 


Hgb  12.8  


 


Hct  38.6  


 


MCV  88.3  


 


MCH  29.3  


 


MCHC  33.2  


 


RDW  14.3  


 


Plt Count  249  


 


MPV  9.8  


 


Neut # (Auto)  7.1 H  


 


Lymph # (Auto)  2.0  


 


Mono # (Auto)  0.7  


 


Eos # (Auto)  0.2  


 


Baso # (Auto)  0.0  


 


Absolute Nucleated RBC  0.00  


 


Nucleated RBC %  0.0  


 


Sodium   136 


 


Potassium   3.8 


 


Chloride   106 


 


Carbon Dioxide   20 L 


 


Anion Gap   10.0 


 


BUN   29 H 


 


Creatinine   1.1 H 


 


Estimated GFR (MDRD)   48 L 


 


Glucose   159 H 


 


Calcium   9.4 


 


Total Bilirubin   0.9 


 


AST   21 


 


ALT   19 


 


Alkaline Phosphatase   67 


 


Troponin I High Sens    5.6


 


B-Natriuretic Peptide   


 


Total Protein   6.9 


 


Albumin   3.8 


 


Globulin   3.1 


 


Albumin/Globulin Ratio   1.2 


 


Lipase   21 L 


 


Urine Color   


 


Urine Clarity   


 


Urine pH   


 


Ur Specific Gravity   


 


Urine Protein   


 


Urine Glucose (UA)   


 


Urine Ketones   


 


Urine Occult Blood   


 


Urine Nitrite   


 


Urine Bilirubin   


 


Urine Urobilinogen   


 


Ur Leukocyte Esterase   


 


Urine RBC   


 


Urine WBC   


 


Ur Squamous Epith Cells   


 


Urine Bacteria   


 


Urine Culture Comments   














  04/22/20 04/22/20





  02:26 02:40


 


WBC  


 


RBC  


 


Hgb  


 


Hct  


 


MCV  


 


MCH  


 


MCHC  


 


RDW  


 


Plt Count  


 


MPV  


 


Neut # (Auto)  


 


Lymph # (Auto)  


 


Mono # (Auto)  


 


Eos # (Auto)  


 


Baso # (Auto)  


 


Absolute Nucleated RBC  


 


Nucleated RBC %  


 


Sodium  


 


Potassium  


 


Chloride  


 


Carbon Dioxide  


 


Anion Gap  


 


BUN  


 


Creatinine  


 


Estimated GFR (MDRD)  


 


Glucose  


 


Calcium  


 


Total Bilirubin  


 


AST  


 


ALT  


 


Alkaline Phosphatase  


 


Troponin I High Sens  


 


B-Natriuretic Peptide  40 


 


Total Protein  


 


Albumin  


 


Globulin  


 


Albumin/Globulin Ratio  


 


Lipase  


 


Urine Color   YELLOW


 


Urine Clarity   CLEAR


 


Urine pH   6.0


 


Ur Specific Gravity   1.010


 


Urine Protein   NEGATIVE


 


Urine Glucose (UA)   NEGATIVE


 


Urine Ketones   NEGATIVE


 


Urine Occult Blood   NEGATIVE


 


Urine Nitrite   NEGATIVE


 


Urine Bilirubin   NEGATIVE


 


Urine Urobilinogen   0.2 (NORMAL)


 


Ur Leukocyte Esterase   NEGATIVE


 


Urine RBC   0-5


 


Urine WBC   0-3


 


Ur Squamous Epith Cells   FEW Squamous


 


Urine Bacteria   Few


 


Urine Culture Comments   NOT INDICATED














- Rads (name of study)


  ** CXR


Radiology: Other (Borderline pulmonary congestion, no other acute 

cardiopulmonary abnormality)





PD MEDICAL DECISION MAKING





- ED course


Complexity details: considered differential (Insulin reaction, hypoglycemia, 

UTI, dysrhythmia, ACS, PNA, electrolyte abnormality)


ED course: 


Patient is awake, alert, no acute distress on arrival.  She does have a somewhat

low temperature, she was given warm blankets and this corrected appropriately.  

Her EKG shows no convincing signs of ischemia or dysrhythmia, and her troponin 

is negative, she has no chest pain, ACS highly unlikely.  Chest x-ray shows 

questionable early pulmonary vascular congestion, however patient has a normal 

oxygen saturation, normal respiratory rate, she states that her breathing feels 

at baseline, do not see signs of significant volume overload or heart failure at

this time.





CBC is unremarkable, she had is no clinical signs of infection.  Her urine is 

negative, no pneumonia on chest x-ray.  Her CBC shows a slightly elevated 

creatinine at 1.1 which appears to be patient's baseline.  She had multiple 

fingerstick glucose checks over her stay in the emergency department, and they 

remained in normal to slightly high range 100s the 200s. On repeat examination 

is feeling very well, denies any complaints, and would like to go home.  Given 

her reassuring work-up and her stable blood sugar, I feel this is appropriate.  

I discussed with patient that she needs to check her blood sugar frequently, and

if she is running low to normal, she should cut her short acting insulin dose in

half at night until she is able to consult further with her primary care 

provider.  I also discussed that she should review her regimen with her 

prescribing provider. She agrees and will be able to get her glucometer first 

thing this morning. I reviewed return precautions with the patient, who was disc

harged home in the care of family.





Departure





- Departure


Disposition: 01 Home, Self Care


Clinical Impression: 


 Hypoglycemia





Condition: Good


Instructions:  ED Diabetes Hypoglycemia Insulin React


Follow-Up: 


Heaven Alonzo DO [Primary Care Provider] - 


Comments: 


You were seen today due to low blood sugar.  Your labs are reassuring and your 

blood sugar has maintained in an adequate range while you have been here.  I am 

glad that you are feeling better.  Please check your blood sugar frequently.  If

you are having any lower than normal blood sugars, please reduce your dose of 

short acting insulin at night by half (to 10 units) until it normalizes.  Also 

please discuss this episode of low blood sugar and your insulin regimen with 

your primary care provider.  If you are having any new concerning symptoms such 

as fever, recurrent low blood sugar, abdominal or chest pain, trouble breathing,

return to the emergency department.

## 2020-04-22 NOTE — XRAY REPORT
Reason:  Chest Pain

Procedure Date:  04/22/2020   

Accession Number:  [id number] / [id number]                    

Procedure:  XR  - Chest 1 View X-Ray CPT Code:  65368

 

***Final Report***

 

 

FULL RESULT:

 

 

EXAM:

CHEST RADIOGRAPHY

 

EXAM DATE: 4/22/2020 02:29 AM.

 

CLINICAL HISTORY: Chest Pain.

 

COMPARISON: CHEST 2 VIEW 01/11/2020 11:15 AM.

 

TECHNIQUE: 1 view.

 

FINDINGS:

Lungs/Pleura: No alveolar consolidation or pleural effusion seen. 

Borderline pulmonary vascular congestion. No pneumothorax.

 

Mediastinum: Within exam limitations, the cardiomediastinal contour is 

normal. Aortic atherosclerosis.

 

Other: None.

IMPRESSION:

1. Borderline pulmonary vascular congestion.

 

RAD[facility]

## 2020-04-25 ENCOUNTER — HOSPITAL ENCOUNTER (EMERGENCY)
Dept: HOSPITAL 76 - ED | Age: 75
Discharge: HOME | End: 2020-04-25
Payer: MEDICARE

## 2020-04-25 VITALS — DIASTOLIC BLOOD PRESSURE: 50 MMHG | SYSTOLIC BLOOD PRESSURE: 125 MMHG

## 2020-04-25 DIAGNOSIS — E11.9: ICD-10-CM

## 2020-04-25 DIAGNOSIS — S86.912A: Primary | ICD-10-CM
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
## 2020-04-25 DIAGNOSIS — I10: ICD-10-CM

## 2020-04-25 PROCEDURE — 73502 X-RAY EXAM HIP UNI 2-3 VIEWS: CPT

## 2020-04-25 PROCEDURE — 99283 EMERGENCY DEPT VISIT LOW MDM: CPT

## 2020-04-25 PROCEDURE — 73552 X-RAY EXAM OF FEMUR 2/>: CPT

## 2020-04-25 PROCEDURE — 99284 EMERGENCY DEPT VISIT MOD MDM: CPT

## 2020-04-25 NOTE — ED PHYSICIAN DOCUMENTATION
PD HPI LOWER EXT INJURY





- Stated complaint


Stated Complaint: GLF - LT LEG PX





- Chief complaint


Chief Complaint: Trauma Ext





- History obtained from


History obtained from: Patient (She slipped and fell in her garage and basically

did the splits.  She complains of left buttock pain radiating out down the 

outside and down towards the knee.  She is able to walk and bear weight but 

barely.  No right leg injury.  No head or neck injury.  She took 2 Tylenol prior

to arrival with only partial relief.  She usually walks without assistance, but 

does have a walker at home.)





- History of Present Illness


Type of injury: Fall


Where injury occurred: Home


Timing - onset: Today





Review of Systems


Nose: reports: Reviewed and negative


Cardiac: reports: Reviewed and negative


Respiratory: reports: Reviewed and negative





PD PAST MEDICAL HISTORY





- Past Medical History


Cardiovascular: Hypertension, High cholesterol


Respiratory: Shortness of breath


Neuro: None


Endocrine/Autoimmune: Type 2 diabetes


GI: None, Colon polyps


: Incontinence


HEENT: Chronic vision loss, Other


Psych: Depression


Musculoskeletal: Osteoarthritis, Gout


Derm: Other





- Past Surgical History


Past Surgical History: Yes


General: Cholecystectomy, Appendectomy, Other





- Present Medications


Home Medications: 


                                Ambulatory Orders











 Medication  Instructions  Recorded  Confirmed


 


Atenolol 50 mg PO DAILY 03/14/14 07/30/19


 


Insulin Aspart (Vial) [NovoLOG] 15 units SUBQ TIDWM 03/14/14 07/30/19


 


Gabapentin 300 mg PO QPM 12/31/15 07/30/19


 


Losartan [Cozaar] 50 mg PO DAILY 12/31/15 07/30/19


 


Simvastatin 20 mg PO QPM 12/31/15 07/30/19


 


Spironolactone 25 mg PO DAILY 12/31/15 07/30/19


 


allopurinoL [Allopurinol] 150 mg PO DAILY 12/31/15 07/30/19


 


Insulin Glargine [Lantus Solostar] 28 units SQ BID 05/31/16 07/30/19


 


Magnesium Oxide [Magnesium] 400 mg PO QPM 07/15/19 07/15/19


 


Thiamine HCl [Vitamin B-1] 100 mg PO DAILY 07/15/19 07/15/19


 


Tolterodine Tartrate [Tolterodine 4 mg PO DAILY 07/15/19 07/15/19





Tartrate ER]   


 


Triamcinolone 0.1% Cream [Kenalog 1 applic TOP BID 07/15/19 07/15/19





0.1% Cream]   


 


Hydrocodone/Acetaminophen 1 each PO Q4HR PRN #20 tablet 07/30/19 





[Hydrocodone-Acetamin 5-325 mg]   


 


Hydrocodone/Acetaminophen 1 - 2 each PO Q6H PRN #10 tablet 04/25/20 





[Hydrocodon-Acetaminophen 5-325]   














- Allergies


Allergies/Adverse Reactions: 


                                    Allergies











Allergy/AdvReac Type Severity Reaction Status Date / Time


 


succinylcholine Allergy Unknown Respiratory Verified 04/25/20 15:04


 


losartan [From Cozaar] Allergy  Unknown Verified 04/25/20 15:04


 


tramadol Allergy  Unknown Verified 04/25/20 15:04


 


bacitracin AdvReac Unknown Rash Verified 04/25/20 15:04





[From Neosporin     





([id number])]     


 


bacitracin zinc * AdvReac Unknown Rash Verified 04/25/20 15:04





[From Neosporin     





([id number])]     


 


diphenhydramine HCl * AdvReac Unknown Edema Verified 04/25/20 15:04





[From Benadryl]     


 


neomycin sulfate * AdvReac Unknown Rash Verified 04/25/20 15:04





[From Neosporin     





([id number])]     


 


polymyxin B AdvReac Unknown Rash Verified 04/25/20 15:04





[From Neosporin     





([id number])]     


 


Sulfa (Sulfonamide AdvReac Unknown Rash Verified 04/25/20 15:04





Antibiotics)     














- Social History


Does the pt smoke?: No


Smoking Status: Never smoker


Does the pt drink ETOH?: No


Does the pt have substance abuse?: No





- Immunizations


Immunizations are current?: Yes





- POLST


Patient has POLST: No





PD ED PE NORMAL





- Vitals


Vital signs reviewed: Yes





- General


General: Alert and oriented X 3, No acute distress





- HEENT


HEENT: PERRL, EOMI





- Neck


Neck: No bony TTP





- Extremities


Extremities: Other (Left hip is nontender.  There is some muscular tenderness of

the buttock posteriorly.  Internal and external rotation of the left hip is 

painless.  Mild tenderness of the anterior left knee.  No tenderness below the 

knee.)





- Neuro


Neuro: Alert and oriented X 3, Normal speech





Results





- Vitals


Vitals: 


                               Vital Signs - 24 hr











  04/25/20





  15:01


 


Temperature 36.2 C L


 


Heart Rate 58 L


 


Respiratory 16





Rate 


 


Blood Pressure 159/72 H


 


O2 Saturation 98








                                     Oxygen











O2 Source                      Room air

















- Rads (name of study)


  ** X-rays left hip and femur


Radiology: EMP read contemporaneously (Normal)





PD MEDICAL DECISION MAKING





- ED course


ED course: 





She fell today, worry of course about a hip injury, that said the location of 

her pain and the examination are inconsistent with a hip fracture.  Pain seems 

more like buttock contusion and strain than anything else.  X-rays were 

negative.





Departure





- Departure


Disposition: 01 Home, Self Care


Clinical Impression: 


Strain of left knee and leg


Qualifiers:


 Encounter type: initial encounter Qualified Code(s): S86.912A - Strain of 

unspecified muscle(s) and tendon(s) at lower leg level, left leg, initial encou

nter





Condition: Good


Record reviewed to determine appropriate education?: Yes


Instructions:  ED Sprain Hip


Prescriptions: 


Hydrocodone/Acetaminophen [Hydrocodon-Acetaminophen 5-325] 1 - 2 each PO Q6H PRN

#10 tablet


 PRN Reason: pain


Comments: 


Recheck with your doctor within the week if not much better, return for new or 

worsening symptoms.

## 2020-04-25 NOTE — XRAY REPORT
Reason:  lle inj

Procedure Date:  04/25/2020   

Accession Number:  [id number] / [id number]                    

Procedure:  XR  - Femur 2V LT CPT Code:  

 

***Final Report***

 

 

FULL RESULT:

 

 

EXAM:

LEFT FEMUR RADIOGRAPHY

 

EXAM DATE: 4/25/2020 03:43 PM.

 

CLINICAL HISTORY: Lle inj.

 

COMPARISON: None.

 

TECHNIQUE: 2 views.

 

FINDINGS:

Bones: No fracture. No bone lesion.

 

Joints: No dislocation

 

Soft Tissues: Normal. No soft tissue swelling.

IMPRESSION: No fracture or dislocation

 

[facility]

## 2020-04-25 NOTE — XRAY REPORT
Reason:  lle inj

Procedure Date:  04/25/2020   

Accession Number:  [id number] / [id number]                    

Procedure:  XR  - Hip w/Pelvis 2-3V LT CPT Code:  

 

***Final Report***

 

 

FULL RESULT:

 

 

EXAM:

LEFT HIP RADIOGRAPHY

 

EXAM DATE: 4/25/2020 03:44 PM.

 

CLINICAL HISTORY: Lle inj.

 

COMPARISON: LUMBAR SPINE 2 VIEW 04/12/2018 3:14 PM.

 

TECHNIQUE: 2 views.

 

FINDINGS:

Bones: No fracture. No bone lesion.

 

Joints: No dislocation. Preserved hip joint spaces. Lower lumbar facet 

degenerative arthropathy.

 

Soft Tissues: Normal. No soft tissue swelling.

IMPRESSION: No fracture or dislocation

 

RAD[facility]

## 2020-05-12 ENCOUNTER — HOSPITAL ENCOUNTER (OUTPATIENT)
Dept: HOSPITAL 76 - LAB.WCP | Age: 75
Discharge: HOME | End: 2020-05-12
Attending: FAMILY MEDICINE
Payer: MEDICARE

## 2020-05-12 DIAGNOSIS — E11.65: Primary | ICD-10-CM

## 2020-05-12 LAB
ALBUMIN DIAFP-MCNC: 3.5 G/DL (ref 3.2–5.5)
ALBUMIN/GLOB SERPL: 1 {RATIO} (ref 1–2.2)
ALP SERPL-CCNC: 71 IU/L (ref 42–121)
ALT SERPL W P-5'-P-CCNC: 18 IU/L (ref 10–60)
ANION GAP SERPL CALCULATED.4IONS-SCNC: 10 MMOL/L (ref 6–13)
AST SERPL W P-5'-P-CCNC: 18 IU/L (ref 10–42)
BILIRUB BLD-MCNC: 0.9 MG/DL (ref 0.2–1)
BUN SERPL-MCNC: 18 MG/DL (ref 6–20)
CALCIUM UR-MCNC: 9.5 MG/DL (ref 8.5–10.3)
CHLORIDE SERPL-SCNC: 103 MMOL/L (ref 101–111)
CO2 SERPL-SCNC: 25 MMOL/L (ref 21–32)
CREAT SERPLBLD-SCNC: 0.9 MG/DL (ref 0.4–1)
EST. AVERAGE GLUCOSE BLD GHB EST-MCNC: 180 MG/DL (ref 70–100)
GLOBULIN SER-MCNC: 3.5 G/DL (ref 2.1–4.2)
GLUCOSE SERPL-MCNC: 178 MG/DL (ref 70–100)
HB2 TOTAL: 13.1 G/DL
HBA1C BLD-MCNC: 0.83 G/DL
HEMOGLOBIN A1C %: 7.9 % (ref 4.6–6.2)
PROT SPEC-MCNC: 7 G/DL (ref 6.7–8.2)
SODIUM SERPLBLD-SCNC: 138 MMOL/L (ref 135–145)

## 2020-05-12 PROCEDURE — 80053 COMPREHEN METABOLIC PANEL: CPT

## 2020-05-12 PROCEDURE — 83036 HEMOGLOBIN GLYCOSYLATED A1C: CPT

## 2020-05-12 PROCEDURE — 36415 COLL VENOUS BLD VENIPUNCTURE: CPT

## 2020-08-07 ENCOUNTER — HOSPITAL ENCOUNTER (OUTPATIENT)
Dept: HOSPITAL 76 - LAB.WCP | Age: 75
Discharge: HOME | End: 2020-08-07
Attending: FAMILY MEDICINE
Payer: MEDICARE

## 2020-08-07 DIAGNOSIS — E11.9: Primary | ICD-10-CM

## 2020-08-07 LAB
ALBUMIN DIAFP-MCNC: 4.1 G/DL (ref 3.2–5.5)
ALBUMIN/GLOB SERPL: 1.1 {RATIO} (ref 1–2.2)
ALP SERPL-CCNC: 75 IU/L (ref 42–121)
ALT SERPL W P-5'-P-CCNC: 40 IU/L (ref 10–60)
ANION GAP SERPL CALCULATED.4IONS-SCNC: 10 MMOL/L (ref 6–13)
AST SERPL W P-5'-P-CCNC: 23 IU/L (ref 10–42)
BILIRUB BLD-MCNC: 0.3 MG/DL (ref 0.2–1)
BUN SERPL-MCNC: 26 MG/DL (ref 6–20)
CALCIUM UR-MCNC: 10.2 MG/DL (ref 8.5–10.3)
CHLORIDE SERPL-SCNC: 99 MMOL/L (ref 101–111)
CHOLEST SERPL-MCNC: 127 MG/DL
CO2 SERPL-SCNC: 27 MMOL/L (ref 21–32)
CREAT SERPLBLD-SCNC: 1.1 MG/DL (ref 0.4–1)
EST. AVERAGE GLUCOSE BLD GHB EST-MCNC: 183 MG/DL (ref 70–100)
GLOBULIN SER-MCNC: 3.9 G/DL (ref 2.1–4.2)
GLUCOSE SERPL-MCNC: 143 MG/DL (ref 70–100)
HB2 TOTAL: 13.4 G/DL
HBA1C BLD-MCNC: 0.86 G/DL
HDLC SERPL-MCNC: 38 MG/DL
HDLC SERPL: 3.3 {RATIO} (ref ?–4.4)
HEMOGLOBIN A1C %: 8 % (ref 4.6–6.2)
LDLC SERPL CALC-MCNC: 50 MG/DL
LDLC/HDLC SERPL: 1.3 {RATIO} (ref ?–4.4)
PROT SPEC-MCNC: 8 G/DL (ref 6.7–8.2)
SODIUM SERPLBLD-SCNC: 136 MMOL/L (ref 135–145)
VLDLC SERPL-SCNC: 39 MG/DL

## 2020-08-07 PROCEDURE — 80061 LIPID PANEL: CPT

## 2020-08-07 PROCEDURE — 83721 ASSAY OF BLOOD LIPOPROTEIN: CPT

## 2020-08-07 PROCEDURE — 83036 HEMOGLOBIN GLYCOSYLATED A1C: CPT

## 2020-08-07 PROCEDURE — 36415 COLL VENOUS BLD VENIPUNCTURE: CPT

## 2020-08-07 PROCEDURE — 80053 COMPREHEN METABOLIC PANEL: CPT

## 2020-09-29 ENCOUNTER — HOSPITAL ENCOUNTER (EMERGENCY)
Dept: HOSPITAL 76 - ED | Age: 75
Discharge: HOME | End: 2020-09-29
Payer: MEDICARE

## 2020-09-29 VITALS — SYSTOLIC BLOOD PRESSURE: 158 MMHG | DIASTOLIC BLOOD PRESSURE: 78 MMHG

## 2020-09-29 DIAGNOSIS — I10: ICD-10-CM

## 2020-09-29 DIAGNOSIS — E11.51: ICD-10-CM

## 2020-09-29 DIAGNOSIS — H81.10: Primary | ICD-10-CM

## 2020-09-29 DIAGNOSIS — I45.10: ICD-10-CM

## 2020-09-29 DIAGNOSIS — R11.0: ICD-10-CM

## 2020-09-29 DIAGNOSIS — Z79.4: ICD-10-CM

## 2020-09-29 LAB
ALBUMIN DIAFP-MCNC: 4.3 G/DL (ref 3.2–5.5)
ALBUMIN/GLOB SERPL: 1.1 {RATIO} (ref 1–2.2)
ALP SERPL-CCNC: 74 IU/L (ref 42–121)
ALT SERPL W P-5'-P-CCNC: 23 IU/L (ref 10–60)
ANION GAP SERPL CALCULATED.4IONS-SCNC: 13 MMOL/L (ref 6–13)
AST SERPL W P-5'-P-CCNC: 17 IU/L (ref 10–42)
BASOPHILS NFR BLD AUTO: 0.1 10^3/UL (ref 0–0.1)
BASOPHILS NFR BLD AUTO: 0.6 %
BILIRUB BLD-MCNC: 0.9 MG/DL (ref 0.2–1)
BUN SERPL-MCNC: 27 MG/DL (ref 6–20)
CALCIUM UR-MCNC: 10.8 MG/DL (ref 8.5–10.3)
CHLORIDE SERPL-SCNC: 96 MMOL/L (ref 101–111)
CO2 SERPL-SCNC: 23 MMOL/L (ref 21–32)
CREAT SERPLBLD-SCNC: 1.3 MG/DL (ref 0.4–1)
EOSINOPHIL # BLD AUTO: 0.2 10^3/UL (ref 0–0.7)
EOSINOPHIL NFR BLD AUTO: 1.8 %
ERYTHROCYTE [DISTWIDTH] IN BLOOD BY AUTOMATED COUNT: 13.3 % (ref 12–15)
GLOBULIN SER-MCNC: 3.8 G/DL (ref 2.1–4.2)
GLUCOSE SERPL-MCNC: 190 MG/DL (ref 70–100)
HGB UR QL STRIP: 14.3 G/DL (ref 12–16)
INR PPP: 1.1 (ref 0.8–1.2)
LIPASE SERPL-CCNC: 20 U/L (ref 22–51)
LYMPHOCYTES # SPEC AUTO: 2.5 10^3/UL (ref 1.5–3.5)
LYMPHOCYTES NFR BLD AUTO: 23.8 %
MCH RBC QN AUTO: 29.7 PG (ref 27–31)
MCHC RBC AUTO-ENTMCNC: 34.3 G/DL (ref 32–36)
MCV RBC AUTO: 86.7 FL (ref 81–99)
MONOCYTES # BLD AUTO: 0.7 10^3/UL (ref 0–1)
MONOCYTES NFR BLD AUTO: 6.5 %
NEUTROPHILS # BLD AUTO: 7 10^3/UL (ref 1.5–6.6)
NEUTROPHILS # SNV AUTO: 10.5 X10^3/UL (ref 4.8–10.8)
NEUTROPHILS NFR BLD AUTO: 66.8 %
PDW BLD AUTO: 9.9 FL (ref 7.9–10.8)
PLATELET # BLD: 302 10^3/UL (ref 130–450)
PROT SPEC-MCNC: 8.1 G/DL (ref 6.7–8.2)
PROTHROM ACT/NOR PPP: 12.6 SECS (ref 9.9–12.6)
RBC MAR: 4.81 10^6/UL (ref 4.2–5.4)
SODIUM SERPLBLD-SCNC: 132 MMOL/L (ref 135–145)

## 2020-09-29 PROCEDURE — 99283 EMERGENCY DEPT VISIT LOW MDM: CPT

## 2020-09-29 PROCEDURE — 99284 EMERGENCY DEPT VISIT MOD MDM: CPT

## 2020-09-29 PROCEDURE — 83690 ASSAY OF LIPASE: CPT

## 2020-09-29 PROCEDURE — 85610 PROTHROMBIN TIME: CPT

## 2020-09-29 PROCEDURE — 80053 COMPREHEN METABOLIC PANEL: CPT

## 2020-09-29 PROCEDURE — 36415 COLL VENOUS BLD VENIPUNCTURE: CPT

## 2020-09-29 PROCEDURE — 93005 ELECTROCARDIOGRAM TRACING: CPT

## 2020-09-29 PROCEDURE — 85025 COMPLETE CBC W/AUTO DIFF WBC: CPT

## 2020-09-29 NOTE — ED PHYSICIAN DOCUMENTATION
History of Present Illness





- Stated complaint


Stated Complaint: DIZZY/NAUSEA





- Chief complaint


Chief Complaint: Neuro





- History obtained from


History obtained from: Patient





- Additonal information


Additional information: 


Patient comes emergency department complaining of any patient of her vertigo 

that started today.  Patient has a history of intermittent positional vertigo 

and states that this feels the same.  She states that she woke up this morning 

noticing that whenever she would turn her head or bend over and come back up, 

she would feel as though the room was spinning.  Patient states if she avoids 

those movements, she feels okay.  Patient complains of nausea when the vertigo 

kicks in.  She denies any focal weakness that is new.  No fevers or chills.  No 

recent viral type illness.  No recent head injury.  Patient is not known to be 

anemic.  She has not vomited or had diarrhea.  She complains of a chronic sense 

of feeling like there is water in her ears.  She states she has been worked up 

for this previously and that nobody can ever figure out why she has this 

particular sensation.  No other complaints at this time.








Review of Systems


Ten Systems: 10 systems reviewed and negative


Constitutional: reports: Reviewed and negative


Eyes: reports: Reviewed and negative


Ears: reports: Tinnitus/ringing (Atypical)


Nose: denies: Rhinorrhea / runny nose


Throat: reports: Reviewed and negative


Cardiac: reports: Reviewed and negative


Respiratory: reports: Reviewed and negative


GI: reports: Nausea.  denies: Abdominal Pain, Vomiting


: reports: Reviewed and negative


Skin: reports: Reviewed and negative


Musculoskeletal: reports: Reviewed and negative


Neurologic: reports: Other (Vertigo)


Psychiatric: reports: Reviewed and negative


Endocrine: reports: Reviewed and negative


Immunocompromised: reports: Reviewed and negative





PD PAST MEDICAL HISTORY





- Past Medical History


Past Medical History: Yes


Cardiovascular: Hypertension, High cholesterol, Peripheral Vascular Disease


Respiratory: Shortness of breath


Neuro: None


Endocrine/Autoimmune: Type 2 diabetes


GI: Colon polyps


: Incontinence


HEENT: Chronic vision loss, Other


Psych: Depression


Musculoskeletal: Osteoarthritis, Gout, Chronic back pain


Derm: Other





- Past Surgical History


Past Surgical History: Yes


General: Cholecystectomy, Appendectomy, Other





- Present Medications


Home Medications: 


                                Ambulatory Orders











 Medication  Instructions  Recorded  Confirmed


 


Atenolol 50 mg PO DAILY 03/14/14 05/22/20


 


Insulin Aspart (Vial) [NovoLOG] 15 - 40 units SUBQ TIDWM 03/14/14 05/22/20


 


Gabapentin 300 mg PO QPM 12/31/15 05/22/20


 


Losartan [Cozaar] 50 mg PO DAILY 12/31/15 05/22/20


 


Spironolactone 25 mg PO DAILY 12/31/15 05/22/20


 


allopurinoL [Allopurinol] 150 mg PO DAILY 12/31/15 05/22/20


 


Insulin Glargine [Lantus Solostar] 30 units SQ BID 05/31/16 05/22/20


 


Tolterodine Tartrate [Tolterodine 4 mg PO DAILY 07/15/19 05/22/20





Tartrate ER]   


 


Triamcinolone 0.1% Cream [Kenalog 1 applic TOP BID 07/15/19 05/22/20





0.1% Cream]   


 


Ascorbic Acid [Vitamin C] 500 mg PO DAILY 05/22/20 05/22/20


 


Atorvastatin Calcium 20 mg PO DAILY 05/22/20 05/22/20


 


Dulaglutide [Trulicity] 1.5 mg SQ OAW 05/22/20 05/22/20


 


Glucagon,Human Recombinant 1 mg IJ PRN PRN 05/22/20 05/22/20





[Glucagon Emergency Kit]   


 


Vitamin E 400 unit PO DAILY 05/22/20 05/22/20


 


Meclizine HCl 25 mg PO Q6HR PRN #20 tablet 09/29/20 














- Allergies


Allergies/Adverse Reactions: 


                                    Allergies











Allergy/AdvReac Type Severity Reaction Status Date / Time


 


succinylcholine Allergy Unknown Respiratory Verified 09/29/20 17:15


 


losartan [From Cozaar] Allergy  Unknown Verified 09/29/20 17:15


 


tramadol Allergy  Unknown Verified 09/29/20 17:15


 


bacitracin AdvReac Unknown Rash Verified 09/29/20 17:15





[From Neosporin     





([id number])]     


 


bacitracin zinc * AdvReac Unknown Rash Verified 09/29/20 17:15





[From Neosporin     





([id number])]     


 


diphenhydramine HCl * AdvReac Unknown Edema Verified 09/29/20 17:15





[From Benadryl]     


 


neomycin sulfate * AdvReac Unknown Rash Verified 09/29/20 17:15





[From Neosporin     





([id number])]     


 


polymyxin B AdvReac Unknown Rash Verified 09/29/20 17:15





[From Neosporin     





([id number])]     


 


Sulfa (Sulfonamide AdvReac Unknown Rash Verified 09/29/20 17:15





Antibiotics)     














- Social History


Does the pt smoke?: No


Smoking Status: Never smoker


Does the pt drink ETOH?: No


Does the pt have substance abuse?: No





- Immunizations


Immunizations are current?: Yes





- POLST


Patient has POLST: No





PD ED PE NORMAL





- Vitals


Vital signs reviewed: Yes





- General


General: Alert and oriented X 3, No acute distress, Well developed/nourished





- HEENT


HEENT: Atraumatic, PERRL, EOMI, Moist mucous membranes





- Neck


Neck: Supple, no meningeal sign





- Cardiac


Cardiac: RRR, No murmur, Strong equal pulses





- Respiratory


Respiratory: No respiratory distress, Clear bilaterally





- Abdomen


Abdomen: Soft, Non tender, Non distended





- Derm


Derm: Normal color, Warm and dry, No rash





- Extremities


Extremities: No deformity, No edema, No calf tenderness / cord





- Neuro


Neuro: Alert and oriented X 3, CNs 2-12 intact, No motor deficit, No sensory 

deficit, Normal speech





- Psych


Psych: Normal mood, Normal affect





Results





- Vitals


Vitals: 


                               Vital Signs - 24 hr











  09/29/20 09/29/20 09/29/20





  17:15 18:33 19:03


 


Temperature 36.7 C  36.7 C


 


Heart Rate 69 69 72


 


Respiratory 18 23 14





Rate   


 


Blood Pressure 121/70 156/79 H 158/78 H


 


O2 Saturation 97 98 100








                                     Oxygen











O2 Source                      Room air

















- EKG (time done)


  ** 1737


Rate: Rate (enter#) (67)


Rhythm: NSR


Axis: Normal


Intervals: Normal [place], RBBB


QRS: Normal


Ischemia: Normal ST segments


Compare to prior EKG: Old EKG unavailable


Computer interpretation: Agree with computer





- Labs


Labs: 


                                Laboratory Tests











  09/29/20 09/29/20 09/29/20





  17:30 17:30 17:30


 


WBC  10.5  


 


RBC  4.81  


 


Hgb  14.3  


 


Hct  41.7  


 


MCV  86.7  


 


MCH  29.7  


 


MCHC  34.3  


 


RDW  13.3  


 


Plt Count  302  


 


MPV  9.9  


 


Neut # (Auto)  7.0 H  


 


Lymph # (Auto)  2.5  


 


Mono # (Auto)  0.7  


 


Eos # (Auto)  0.2  


 


Baso # (Auto)  0.1  


 


Absolute Nucleated RBC  0.00  


 


Nucleated RBC %  0.0  


 


PT   12.6 


 


INR   1.1 


 


Sodium    132 L


 


Potassium    3.8


 


Chloride    96 L


 


Carbon Dioxide    23


 


Anion Gap    13.0


 


BUN    27 H


 


Creatinine    1.3 H


 


Estimated GFR (MDRD)    40 L


 


Glucose    190 H


 


POC Whole Bld Glucose   


 


Calcium    10.8 H


 


Total Bilirubin    0.9


 


AST    17


 


ALT    23


 


Alkaline Phosphatase    74


 


Total Protein    8.1


 


Albumin    4.3


 


Globulin    3.8


 


Albumin/Globulin Ratio    1.1


 


Lipase    20 L














  09/29/20





  17:46


 


WBC 


 


RBC 


 


Hgb 


 


Hct 


 


MCV 


 


MCH 


 


MCHC 


 


RDW 


 


Plt Count 


 


MPV 


 


Neut # (Auto) 


 


Lymph # (Auto) 


 


Mono # (Auto) 


 


Eos # (Auto) 


 


Baso # (Auto) 


 


Absolute Nucleated RBC 


 


Nucleated RBC % 


 


PT 


 


INR 


 


Sodium 


 


Potassium 


 


Chloride 


 


Carbon Dioxide 


 


Anion Gap 


 


BUN 


 


Creatinine 


 


Estimated GFR (MDRD) 


 


Glucose 


 


POC Whole Bld Glucose  177 H


 


Calcium 


 


Total Bilirubin 


 


AST 


 


ALT 


 


Alkaline Phosphatase 


 


Total Protein 


 


Albumin 


 


Globulin 


 


Albumin/Globulin Ratio 


 


Lipase 














PD MEDICAL DECISION MAKING





- ED course


Complexity details: reviewed old records, reviewed results, re-evaluated 

patient, considered differential, d/w patient, d/w family


ED course: 


The patient symptoms were consistent with benign positional vertigo, and been a

fflicted by this before and felt that the symptoms were the same additionally, 

the patient had as her prior episodes.  Furthermore, the patient did not have 

any focal neurologic deficits to raise concern for a more serious intracranial 

process.  The patient was treated with IV fluids and meclizine, as well as 

Zofran.  She was worked up for any potential exacerbating factors, such as 

anemia or electrolyte abnormalities, with labs.  Pt was found to be feeling 

better, and was ambulatory without difficulty in the ED.  PT was stable for D/C 

home.  We have discussed symptomatic management at home, as well as the usual 

indications for return.








Departure





- Departure


Disposition: 01 Home, Self Care


Clinical Impression: 


Benign positional vertigo


Qualifiers:


 Laterality: unspecified laterality Qualified Code(s): H81.10 - Benign 

paroxysmal vertigo, unspecified ear





Condition: Stable


Instructions:  ED BPV Vertigo


Prescriptions: 


Meclizine HCl 25 mg PO Q6HR PRN #20 tablet


 PRN Reason: Dizziness


Comments: 


Your labs look good.  Your symptoms are consistent with your previous episodes 

of vertigo, and there is no evidence of a brain source today.  Please follow-up 

with your primary care physician.  You may take the medication prescribed as 

needed for further episodes of vertigo.


Discharge Date/Time: 09/29/20 19:15

## 2020-10-06 ENCOUNTER — HOSPITAL ENCOUNTER (EMERGENCY)
Dept: HOSPITAL 76 - ED | Age: 75
Discharge: HOME | End: 2020-10-06
Payer: MEDICARE

## 2020-10-06 ENCOUNTER — HOSPITAL ENCOUNTER (OUTPATIENT)
Dept: HOSPITAL 76 - EMS | Age: 75
Discharge: ANOTHER HOSPITAL | End: 2020-10-06
Attending: SURGERY
Payer: MEDICARE

## 2020-10-06 VITALS — DIASTOLIC BLOOD PRESSURE: 72 MMHG | SYSTOLIC BLOOD PRESSURE: 110 MMHG

## 2020-10-06 DIAGNOSIS — Z79.02: ICD-10-CM

## 2020-10-06 DIAGNOSIS — R42: Primary | ICD-10-CM

## 2020-10-06 DIAGNOSIS — E86.0: Primary | ICD-10-CM

## 2020-10-06 DIAGNOSIS — I10: ICD-10-CM

## 2020-10-06 DIAGNOSIS — I45.10: ICD-10-CM

## 2020-10-06 DIAGNOSIS — E11.51: ICD-10-CM

## 2020-10-06 DIAGNOSIS — Z79.4: ICD-10-CM

## 2020-10-06 DIAGNOSIS — Z79.82: ICD-10-CM

## 2020-10-06 DIAGNOSIS — R42: ICD-10-CM

## 2020-10-06 DIAGNOSIS — R11.0: ICD-10-CM

## 2020-10-06 DIAGNOSIS — E11.65: ICD-10-CM

## 2020-10-06 DIAGNOSIS — Z95.5: ICD-10-CM

## 2020-10-06 DIAGNOSIS — E87.1: ICD-10-CM

## 2020-10-06 DIAGNOSIS — I25.10: ICD-10-CM

## 2020-10-06 DIAGNOSIS — E78.5: ICD-10-CM

## 2020-10-06 DIAGNOSIS — R35.0: ICD-10-CM

## 2020-10-06 LAB
ALBUMIN DIAFP-MCNC: 3.9 G/DL (ref 3.2–5.5)
ALBUMIN DIAFP-MCNC: 4.2 G/DL (ref 3.2–5.5)
ALBUMIN/GLOB SERPL: 1.1 {RATIO} (ref 1–2.2)
ALBUMIN/GLOB SERPL: 1.3 {RATIO} (ref 1–2.2)
ALP SERPL-CCNC: 69 IU/L (ref 42–121)
ALP SERPL-CCNC: 79 IU/L (ref 42–121)
ALT SERPL W P-5'-P-CCNC: 17 IU/L (ref 10–60)
ALT SERPL W P-5'-P-CCNC: 18 IU/L (ref 10–60)
ANION GAP SERPL CALCULATED.4IONS-SCNC: 12 MMOL/L (ref 6–13)
ANION GAP SERPL CALCULATED.4IONS-SCNC: 14 MMOL/L (ref 6–13)
APTT PPP: 22.4 SECS (ref 24.9–33.3)
AST SERPL W P-5'-P-CCNC: 18 IU/L (ref 10–42)
AST SERPL W P-5'-P-CCNC: 20 IU/L (ref 10–42)
BASOPHILS NFR BLD AUTO: 0.1 10^3/UL (ref 0–0.1)
BASOPHILS NFR BLD AUTO: 0.7 %
BILIRUB BLD-MCNC: 0.8 MG/DL (ref 0.2–1)
BILIRUB BLD-MCNC: 1.2 MG/DL (ref 0.2–1)
BUN SERPL-MCNC: 41 MG/DL (ref 6–20)
BUN SERPL-MCNC: 45 MG/DL (ref 6–20)
CALCIUM UR-MCNC: 10.3 MG/DL (ref 8.5–10.3)
CALCIUM UR-MCNC: 9.3 MG/DL (ref 8.5–10.3)
CASTS URNS QL MICRO: (no result) /LPF
CHLORIDE SERPL-SCNC: 89 MMOL/L (ref 101–111)
CHLORIDE SERPL-SCNC: 99 MMOL/L (ref 101–111)
CLARITY UR REFRACT.AUTO: CLEAR
CO2 SERPL-SCNC: 21 MMOL/L (ref 21–32)
CO2 SERPL-SCNC: 23 MMOL/L (ref 21–32)
CREAT SERPLBLD-SCNC: 1.5 MG/DL (ref 0.4–1)
CREAT SERPLBLD-SCNC: 1.7 MG/DL (ref 0.4–1)
EOSINOPHIL # BLD AUTO: 0.1 10^3/UL (ref 0–0.7)
EOSINOPHIL NFR BLD AUTO: 0.6 %
ERYTHROCYTE [DISTWIDTH] IN BLOOD BY AUTOMATED COUNT: 13 % (ref 12–15)
GLOBULIN SER-MCNC: 3.1 G/DL (ref 2.1–4.2)
GLOBULIN SER-MCNC: 3.8 G/DL (ref 2.1–4.2)
GLUCOSE SERPL-MCNC: 232 MG/DL (ref 70–100)
GLUCOSE SERPL-MCNC: 418 MG/DL (ref 70–100)
GLUCOSE UR QL STRIP.AUTO: 250 MG/DL
HGB UR QL STRIP: 14.7 G/DL (ref 12–16)
INR PPP: 1.2 (ref 0.8–1.2)
KETONES UR QL STRIP.AUTO: NEGATIVE MG/DL
LIPASE SERPL-CCNC: 55 U/L (ref 22–51)
LIPASE SERPL-CCNC: 57 U/L (ref 22–51)
LYMPHOCYTES # SPEC AUTO: 1.5 10^3/UL (ref 1.5–3.5)
LYMPHOCYTES NFR BLD AUTO: 14.4 %
MCH RBC QN AUTO: 29.6 PG (ref 27–31)
MCHC RBC AUTO-ENTMCNC: 34.8 G/DL (ref 32–36)
MCV RBC AUTO: 85.1 FL (ref 81–99)
MONOCYTES # BLD AUTO: 1 10^3/UL (ref 0–1)
MONOCYTES NFR BLD AUTO: 9.7 %
NEUTROPHILS # BLD AUTO: 7.6 10^3/UL (ref 1.5–6.6)
NEUTROPHILS # SNV AUTO: 10.3 X10^3/UL (ref 4.8–10.8)
NEUTROPHILS NFR BLD AUTO: 73.9 %
NITRITE UR QL STRIP.AUTO: NEGATIVE
PDW BLD AUTO: 10.2 FL (ref 7.9–10.8)
PH UR STRIP.AUTO: 7.5 PH (ref 5–7.5)
PLATELET # BLD: 299 10^3/UL (ref 130–450)
PROT SPEC-MCNC: 7 G/DL (ref 6.7–8.2)
PROT SPEC-MCNC: 8 G/DL (ref 6.7–8.2)
PROT UR STRIP.AUTO-MCNC: 100 MG/DL
PROTHROM ACT/NOR PPP: 13.1 SECS (ref 9.9–12.6)
RBC # UR STRIP.AUTO: (no result) /UL
RBC # URNS HPF: (no result) /HPF (ref 0–5)
RBC MAR: 4.96 10^6/UL (ref 4.2–5.4)
SODIUM SERPLBLD-SCNC: 126 MMOL/L (ref 135–145)
SODIUM SERPLBLD-SCNC: 132 MMOL/L (ref 135–145)
SP GR UR STRIP.AUTO: 1.02 (ref 1–1.03)
SQUAMOUS URNS QL MICRO: (no result)
UROBILINOGEN UR QL STRIP.AUTO: (no result) E.U./DL
UROBILINOGEN UR STRIP.AUTO-MCNC: NEGATIVE MG/DL

## 2020-10-06 PROCEDURE — 82009 KETONE BODYS QUAL: CPT

## 2020-10-06 PROCEDURE — 71045 X-RAY EXAM CHEST 1 VIEW: CPT

## 2020-10-06 PROCEDURE — 85730 THROMBOPLASTIN TIME PARTIAL: CPT

## 2020-10-06 PROCEDURE — 81003 URINALYSIS AUTO W/O SCOPE: CPT

## 2020-10-06 PROCEDURE — 84484 ASSAY OF TROPONIN QUANT: CPT

## 2020-10-06 PROCEDURE — 83690 ASSAY OF LIPASE: CPT

## 2020-10-06 PROCEDURE — 96361 HYDRATE IV INFUSION ADD-ON: CPT

## 2020-10-06 PROCEDURE — 85025 COMPLETE CBC W/AUTO DIFF WBC: CPT

## 2020-10-06 PROCEDURE — 93005 ELECTROCARDIOGRAM TRACING: CPT

## 2020-10-06 PROCEDURE — 81001 URINALYSIS AUTO W/SCOPE: CPT

## 2020-10-06 PROCEDURE — 83880 ASSAY OF NATRIURETIC PEPTIDE: CPT

## 2020-10-06 PROCEDURE — 99281 EMR DPT VST MAYX REQ PHY/QHP: CPT

## 2020-10-06 PROCEDURE — 36415 COLL VENOUS BLD VENIPUNCTURE: CPT

## 2020-10-06 PROCEDURE — 96360 HYDRATION IV INFUSION INIT: CPT

## 2020-10-06 PROCEDURE — 85610 PROTHROMBIN TIME: CPT

## 2020-10-06 PROCEDURE — 87086 URINE CULTURE/COLONY COUNT: CPT

## 2020-10-06 PROCEDURE — 80053 COMPREHEN METABOLIC PANEL: CPT

## 2020-10-06 NOTE — XRAY REPORT
PROCEDURE:  Chest 1 View X-Ray

 

INDICATIONS:  Chest Pain

 

TECHNIQUE:  One view of the chest was acquired.  

 

COMPARISON:  Chest radiograph dated 4/22/2020

 

FINDINGS:  

 

Surgical changes and devices:  None.  

 

Lungs and pleura:  No pleural effusions or pneumothorax.  Lungs are clear.  

 

Mediastinum:  Mediastinal contours appear normal.  Heart size is normal.  Moderate atherosclerotic ca
lcifications are seen in the aorta.

 

Bones and chest wall:  No suspicious bony lesions.  Overlying soft tissues appear unremarkable.  

 

IMPRESSION:  

No acute cardiopulmonary abnormality.

 

Reviewed by: Hector Arellano MD on 10/6/2020 6:32 PM PDT

Approved by: Hector Arellano MD on 10/6/2020 6:32 PM PDT

 

 

Station ID:  [id number]

## 2020-10-14 ENCOUNTER — HOSPITAL ENCOUNTER (OUTPATIENT)
Dept: HOSPITAL 76 - LAB.WCP | Age: 75
Discharge: HOME | End: 2020-10-14
Attending: FAMILY MEDICINE
Payer: MEDICARE

## 2020-10-14 DIAGNOSIS — E11.69: ICD-10-CM

## 2020-10-14 DIAGNOSIS — Z79.4: ICD-10-CM

## 2020-10-14 DIAGNOSIS — I10: ICD-10-CM

## 2020-10-14 DIAGNOSIS — E78.2: Primary | ICD-10-CM

## 2020-10-14 LAB
ALBUMIN DIAFP-MCNC: 4.2 G/DL (ref 3.2–5.5)
ALBUMIN/GLOB SERPL: 1.3 {RATIO} (ref 1–2.2)
ALP SERPL-CCNC: 79 IU/L (ref 42–121)
ALT SERPL W P-5'-P-CCNC: 16 IU/L (ref 10–60)
ANION GAP SERPL CALCULATED.4IONS-SCNC: 13 MMOL/L (ref 6–13)
AST SERPL W P-5'-P-CCNC: 17 IU/L (ref 10–42)
BASOPHILS NFR BLD AUTO: 0.1 10^3/UL (ref 0–0.1)
BASOPHILS NFR BLD AUTO: 0.7 %
BILIRUB BLD-MCNC: 0.9 MG/DL (ref 0.2–1)
BUN SERPL-MCNC: 33 MG/DL (ref 6–20)
CALCIUM UR-MCNC: 10.5 MG/DL (ref 8.5–10.3)
CHLORIDE SERPL-SCNC: 95 MMOL/L (ref 101–111)
CHOLEST SERPL-MCNC: 144 MG/DL
CO2 SERPL-SCNC: 24 MMOL/L (ref 21–32)
CREAT SERPLBLD-SCNC: 1.4 MG/DL (ref 0.4–1)
EOSINOPHIL # BLD AUTO: 0.4 10^3/UL (ref 0–0.7)
EOSINOPHIL NFR BLD AUTO: 4 %
ERYTHROCYTE [DISTWIDTH] IN BLOOD BY AUTOMATED COUNT: 13.1 % (ref 12–15)
GLOBULIN SER-MCNC: 3.3 G/DL (ref 2.1–4.2)
GLUCOSE SERPL-MCNC: 199 MG/DL (ref 70–100)
HBA1C MFR BLD HPLC: 8.9 % (ref 4.27–6.07)
HDLC SERPL-MCNC: 31 MG/DL
HDLC SERPL: 4.6 {RATIO} (ref ?–4.4)
HGB UR QL STRIP: 14.1 G/DL (ref 12–16)
LDLC SERPL CALC-MCNC: 70 MG/DL
LDLC/HDLC SERPL: 2.3 {RATIO} (ref ?–4.4)
LYMPHOCYTES # SPEC AUTO: 2.4 10^3/UL (ref 1.5–3.5)
LYMPHOCYTES NFR BLD AUTO: 22.3 %
MCH RBC QN AUTO: 29.4 PG (ref 27–31)
MCHC RBC AUTO-ENTMCNC: 33.8 G/DL (ref 32–36)
MCV RBC AUTO: 87.1 FL (ref 81–99)
MONOCYTES # BLD AUTO: 0.9 10^3/UL (ref 0–1)
MONOCYTES NFR BLD AUTO: 8.2 %
NEUTROPHILS # BLD AUTO: 6.9 10^3/UL (ref 1.5–6.6)
NEUTROPHILS # SNV AUTO: 10.7 X10^3/UL (ref 4.8–10.8)
NEUTROPHILS NFR BLD AUTO: 64.2 %
PDW BLD AUTO: 10.4 FL (ref 7.9–10.8)
PLATELET # BLD: 338 10^3/UL (ref 130–450)
PROT SPEC-MCNC: 7.5 G/DL (ref 6.7–8.2)
RBC MAR: 4.79 10^6/UL (ref 4.2–5.4)
SODIUM SERPLBLD-SCNC: 132 MMOL/L (ref 135–145)
VLDLC SERPL-SCNC: 43 MG/DL

## 2020-10-14 PROCEDURE — 80061 LIPID PANEL: CPT

## 2020-10-14 PROCEDURE — 83735 ASSAY OF MAGNESIUM: CPT

## 2020-10-14 PROCEDURE — 36415 COLL VENOUS BLD VENIPUNCTURE: CPT

## 2020-10-14 PROCEDURE — 83036 HEMOGLOBIN GLYCOSYLATED A1C: CPT

## 2020-10-14 PROCEDURE — 80053 COMPREHEN METABOLIC PANEL: CPT

## 2020-10-14 PROCEDURE — 83721 ASSAY OF BLOOD LIPOPROTEIN: CPT

## 2020-10-14 PROCEDURE — 85025 COMPLETE CBC W/AUTO DIFF WBC: CPT

## 2020-10-29 ENCOUNTER — HOSPITAL ENCOUNTER (EMERGENCY)
Dept: HOSPITAL 76 - ED | Age: 75
Discharge: HOME | End: 2020-10-29
Payer: MEDICARE

## 2020-10-29 VITALS — SYSTOLIC BLOOD PRESSURE: 139 MMHG | DIASTOLIC BLOOD PRESSURE: 68 MMHG

## 2020-10-29 DIAGNOSIS — I10: ICD-10-CM

## 2020-10-29 DIAGNOSIS — E11.51: ICD-10-CM

## 2020-10-29 DIAGNOSIS — Z95.5: ICD-10-CM

## 2020-10-29 DIAGNOSIS — R10.13: Primary | ICD-10-CM

## 2020-10-29 DIAGNOSIS — Z79.02: ICD-10-CM

## 2020-10-29 DIAGNOSIS — Z79.4: ICD-10-CM

## 2020-10-29 DIAGNOSIS — I45.10: ICD-10-CM

## 2020-10-29 DIAGNOSIS — Z79.82: ICD-10-CM

## 2020-10-29 LAB
ALBUMIN DIAFP-MCNC: 3.8 G/DL (ref 3.2–5.5)
ALBUMIN/GLOB SERPL: 1.2 {RATIO} (ref 1–2.2)
ALP SERPL-CCNC: 67 IU/L (ref 42–121)
ALT SERPL W P-5'-P-CCNC: 17 IU/L (ref 10–60)
ANION GAP SERPL CALCULATED.4IONS-SCNC: 14 MMOL/L (ref 6–13)
AST SERPL W P-5'-P-CCNC: 18 IU/L (ref 10–42)
BACTERIA #/AREA URNS HPF: (no result) /HPF
BASOPHILS NFR BLD AUTO: 0.1 10^3/UL (ref 0–0.1)
BASOPHILS NFR BLD AUTO: 0.6 %
BILIRUB BLD-MCNC: 0.7 MG/DL (ref 0.2–1)
BUN SERPL-MCNC: 23 MG/DL (ref 6–20)
CALCIUM UR-MCNC: 10.3 MG/DL (ref 8.5–10.3)
CHLORIDE SERPL-SCNC: 100 MMOL/L (ref 101–111)
CLARITY UR REFRACT.AUTO: CLEAR
CO2 SERPL-SCNC: 22 MMOL/L (ref 21–32)
CREAT SERPLBLD-SCNC: 1.2 MG/DL (ref 0.4–1)
EOSINOPHIL # BLD AUTO: 0.3 10^3/UL (ref 0–0.7)
EOSINOPHIL NFR BLD AUTO: 3.5 %
ERYTHROCYTE [DISTWIDTH] IN BLOOD BY AUTOMATED COUNT: 13.3 % (ref 12–15)
GLOBULIN SER-MCNC: 3.1 G/DL (ref 2.1–4.2)
GLUCOSE SERPL-MCNC: 166 MG/DL (ref 70–100)
GLUCOSE UR QL STRIP.AUTO: NEGATIVE MG/DL
HGB UR QL STRIP: 13.8 G/DL (ref 12–16)
KETONES UR QL STRIP.AUTO: NEGATIVE MG/DL
LIPASE SERPL-CCNC: 19 U/L (ref 22–51)
LYMPHOCYTES # SPEC AUTO: 2.6 10^3/UL (ref 1.5–3.5)
LYMPHOCYTES NFR BLD AUTO: 31.8 %
MCH RBC QN AUTO: 29.8 PG (ref 27–31)
MCHC RBC AUTO-ENTMCNC: 34 G/DL (ref 32–36)
MCV RBC AUTO: 87.7 FL (ref 81–99)
MONOCYTES # BLD AUTO: 0.6 10^3/UL (ref 0–1)
MONOCYTES NFR BLD AUTO: 7.4 %
NEUTROPHILS # BLD AUTO: 4.6 10^3/UL (ref 1.5–6.6)
NEUTROPHILS # SNV AUTO: 8.1 X10^3/UL (ref 4.8–10.8)
NEUTROPHILS NFR BLD AUTO: 56.3 %
NITRITE UR QL STRIP.AUTO: NEGATIVE
PDW BLD AUTO: 9.3 FL (ref 7.9–10.8)
PH UR STRIP.AUTO: 5.5 PH (ref 5–7.5)
PLATELET # BLD: 277 10^3/UL (ref 130–450)
PROT SPEC-MCNC: 6.9 G/DL (ref 6.7–8.2)
PROT UR STRIP.AUTO-MCNC: 30 MG/DL
RBC # UR STRIP.AUTO: (no result) /UL
RBC # URNS HPF: (no result) /HPF (ref 0–5)
RBC MAR: 4.63 10^6/UL (ref 4.2–5.4)
SODIUM SERPLBLD-SCNC: 136 MMOL/L (ref 135–145)
SP GR UR STRIP.AUTO: >=1.03 (ref 1–1.03)
SQUAMOUS URNS QL MICRO: (no result)
UROBILINOGEN UR QL STRIP.AUTO: (no result) E.U./DL
UROBILINOGEN UR STRIP.AUTO-MCNC: NEGATIVE MG/DL

## 2020-10-29 PROCEDURE — 93005 ELECTROCARDIOGRAM TRACING: CPT

## 2020-10-29 PROCEDURE — 84484 ASSAY OF TROPONIN QUANT: CPT

## 2020-10-29 PROCEDURE — 83690 ASSAY OF LIPASE: CPT

## 2020-10-29 PROCEDURE — 71045 X-RAY EXAM CHEST 1 VIEW: CPT

## 2020-10-29 PROCEDURE — 81001 URINALYSIS AUTO W/SCOPE: CPT

## 2020-10-29 PROCEDURE — 99284 EMERGENCY DEPT VISIT MOD MDM: CPT

## 2020-10-29 PROCEDURE — 87086 URINE CULTURE/COLONY COUNT: CPT

## 2020-10-29 PROCEDURE — 80053 COMPREHEN METABOLIC PANEL: CPT

## 2020-10-29 PROCEDURE — 81003 URINALYSIS AUTO W/O SCOPE: CPT

## 2020-10-29 PROCEDURE — 36415 COLL VENOUS BLD VENIPUNCTURE: CPT

## 2020-10-29 PROCEDURE — 85025 COMPLETE CBC W/AUTO DIFF WBC: CPT

## 2020-10-29 NOTE — XRAY REPORT
PROCEDURE:  Chest 1 View X-Ray

 

INDICATIONS:  Chest Pain

 

TECHNIQUE:  One view of the chest was acquired.  

 

COMPARISON:  10/6/2020, 4/22/2020

 

FINDINGS:  

 

Surgical changes and devices:  None.  

 

Lungs and pleura:  No pleural effusions or pneumothorax.  Lungs are clear.  

 

Mediastinum:  Mediastinal contours appear normal.  Heart size is normal.  

 

Bones and chest wall:  No suspicious bony lesions.  Overlying soft tissues appear unremarkable.  

 

 

IMPRESSION:  

 

No acute cardiopulmonary disease process.

 

Reviewed by: Manisha Severino MD, PhD on 10/29/2020 11:57 AM PDT

Approved by: Manisha Severino MD, PhD on 10/29/2020 11:57 AM PDT

 

 

Station ID:  [id number]

## 2020-10-29 NOTE — ED PHYSICIAN DOCUMENTATION
History of Present Illness





- Stated complaint


Stated Complaint: abd pain





- Chief complaint


Chief Complaint: Abd Pain





- History obtained from


History obtained from: Patient





- History of Present Illness


Timing: How many weeks ago (2)


Pain level max: 2


Pain level now: 0





- Additonal information


Additional information: 





75-year-old female presents to the emergency department stating that she has had

worsening heartburn for the past 2 weeks since starting on a new medication.  

She does not know what the medication is or what it is for.  She states she has 

been urinating more frequently as well.  No dysuria.  No fevers.  No chills.  No

chest pain.  No shortness of breath.  Currently is asymptomatic.  She states she

called her doctor today who told her to come to the emergency department for 

evaluation.  Patient states she had cardiac stents placed approximately 2 months

ago. Patient states that they have adjusted several medications between her 

doctor and her cardiologist.  She is unsure what she is taking at this time. 

Epigastric pain is worse with eating and drinking, better with Tums.





Review of Systems


Constitutional: denies: Fever, Chills


Throat: denies: Sore throat


Cardiac: denies: Chest pain / pressure


Respiratory: denies: Cough


GI: reports: Vomiting (X1 2 days ago).  denies: Diarrhea


Skin: denies: Rash


Musculoskeletal: denies: Neck pain, Back pain


Neurologic: denies: Headache





PD PAST MEDICAL HISTORY





- Past Medical History


Cardiovascular: Hypertension, High cholesterol, Peripheral Vascular Disease


Respiratory: Shortness of breath


Neuro: None


Endocrine/Autoimmune: Type 2 diabetes


GI: Colon polyps


: Incontinence


HEENT: Chronic vision loss, Other


Psych: Depression


Musculoskeletal: Osteoarthritis, Gout, Chronic back pain


Derm: Other





- Past Surgical History


Past Surgical History: Yes


General: Cholecystectomy, Appendectomy, Other





- Present Medications


Home Medications: 


                                Ambulatory Orders











 Medication  Instructions  Recorded  Confirmed


 


Insulin Aspart (Vial) [NovoLOG] 15 - 40 units SUBQ TIDWM 03/14/14 10/29/20


 


allopurinoL [Allopurinol] 150 mg PO DAILY 12/31/15 10/29/20


 


Insulin Glargine [Lantus Solostar] 30 units SQ BID 05/31/16 10/29/20


 


Tolterodine Tartrate [Tolterodine 4 mg PO DAILY PM 07/15/19 10/29/20





Tartrate ER]   


 


Ascorbic Acid [Vitamin C] 500 mg PO DAILY 05/22/20 10/29/20


 


Dulaglutide [Trulicity] 1.5 mg SQ OAW 05/22/20 10/29/20


 


Glucagon,Human Recombinant 1 mg IJ PRN PRN 05/22/20 10/29/20





[Glucagon Emergency Kit]   


 


Amlodipine Besylate 5 mg PO DAILY 10/29/20 10/29/20


 


Aspirin Chewable [[place] 81 mg PO DAILY PM 10/29/20 10/29/20





Aspirin]   


 


Clopidogrel [Plavix] 75 mg PO DAILY 10/29/20 10/29/20


 


Cyanocobalamin (Vitamin B-12) 1,000 mcg PO DAILY 10/29/20 10/29/20





[Vitamin B-12]   


 


Gabapentin [Neurontin] 300 mg PO DAILY PM 10/29/20 10/29/20


 


Magnesium Oxide [Magnesium] 400 mg PO DAILY PM 10/29/20 10/29/20


 


Rosuvastatin Calcium 40 mg PO DAILY PM 10/29/20 10/29/20














- Allergies


Allergies/Adverse Reactions: 


                                    Allergies











Allergy/AdvReac Type Severity Reaction Status Date / Time


 


succinylcholine Allergy Unknown Respiratory Verified 10/29/20 10:39


 


losartan [From Cozaar] Allergy  Unknown Verified 10/29/20 10:39


 


tramadol Allergy  Unknown Verified 10/29/20 10:39


 


bacitracin AdvReac Unknown Rash Verified 10/29/20 10:39





[From Neosporin     





([id number])]     


 


bacitracin zinc * AdvReac Unknown Rash Verified 10/29/20 10:39





[From Neosporin     





([id number])]     


 


diphenhydramine HCl * AdvReac Unknown Edema Verified 10/29/20 10:39





[From Benadryl]     


 


neomycin sulfate * AdvReac Unknown Rash Verified 10/29/20 10:39





[From Neosporin     





([id number])]     


 


polymyxin B AdvReac Unknown Rash Verified 10/29/20 10:39





[From Neosporin     





([id number])]     


 


Sulfa (Sulfonamide AdvReac Unknown Rash Verified 10/29/20 10:39





Antibiotics)     














- Social History


Does the pt smoke?: No


Smoking Status: Never smoker


Does the pt drink ETOH?: No


Does the pt have substance abuse?: No





- Immunizations


Immunizations are current?: Yes





- POLST


Patient has POLST: No





PD ED PE NORMAL





- Vitals


Vital signs reviewed: Yes





- General


General: Alert and oriented X 3, No acute distress, Well developed/nourished





- HEENT


HEENT: PERRL, Moist mucous membranes





- Neck


Neck: Supple, no meningeal sign





- Cardiac


Cardiac: RRR, Strong equal pulses





- Respiratory


Respiratory: No respiratory distress, Clear bilaterally





- Abdomen


Abdomen: Normal bowel sounds, Soft, Non tender, Non distended





- Back


Back: No CVA TTP, No spinal TTP





- Derm


Derm: Warm and dry





- Extremities


Extremities: No calf tenderness / cord





- Neuro


Neuro: Alert and oriented X 3





- Psych


Psych: Normal mood, Normal affect





Results





- Vitals


Vitals: 


                               Vital Signs - 24 hr











  10/29/20 10/29/20





  10:36 12:31


 


Temperature 36.7 C 36.7 C


 


Heart Rate 71 75


 


Respiratory 20 24





Rate  


 


Blood Pressure 142/72 H 139/68 H


 


O2 Saturation 98 100








                                     Oxygen











O2 Source                      Room air

















- EKG (time done)


  ** 1111


Rate: Rate (enter#) (70)


Rhythm: NSR


Axis: Normal


Intervals: RBBB





- Labs


Labs: 


                                Laboratory Tests











  10/29/20 10/29/20 10/29/20





  10:45 11:10 11:10


 


WBC   8.1 


 


RBC   4.63 


 


Hgb   13.8 


 


Hct   40.6 


 


MCV   87.7 


 


MCH   29.8 


 


MCHC   34.0 


 


RDW   13.3 


 


Plt Count   277 


 


MPV   9.3 


 


Neut # (Auto)   4.6 


 


Lymph # (Auto)   2.6 


 


Mono # (Auto)   0.6 


 


Eos # (Auto)   0.3 


 


Baso # (Auto)   0.1 


 


Absolute Nucleated RBC   0.00 


 


Nucleated RBC %   0.0 


 


Sodium    136


 


Potassium    3.9


 


Chloride    100 L


 


Carbon Dioxide    22


 


Anion Gap    14.0 H


 


BUN    23 H


 


Creatinine    1.2 H


 


Estimated GFR (MDRD)    44 L


 


Glucose    166 H


 


Calcium    10.3


 


Total Bilirubin    0.7


 


AST    18


 


ALT    17


 


Alkaline Phosphatase    67


 


Troponin I High Sens   


 


Total Protein    6.9


 


Albumin    3.8


 


Globulin    3.1


 


Albumin/Globulin Ratio    1.2


 


Lipase    19 L


 


Urine Color  YELLOW  


 


Urine Clarity  CLEAR  


 


Urine pH  5.5  


 


Ur Specific Gravity  >=1.030 H  


 


Urine Protein  30 H  


 


Urine Glucose (UA)  NEGATIVE  


 


Urine Ketones  NEGATIVE  


 


Urine Occult Blood  TRACE-INTA  


 


Urine Nitrite  NEGATIVE  


 


Urine Bilirubin  NEGATIVE  


 


Urine Urobilinogen  0.2 (NORMAL)  


 


Ur Leukocyte Esterase  NEGATIVE  


 


Urine RBC  QNS  


 


Urine WBC  QNS  


 


Ur Squamous Epith Cells  QNS  


 


Urine Bacteria  QNS  


 


Ur Microscopic Review  INDICATED  


 


Urine Culture Comments  Not Reportable  














  10/29/20





  11:10


 


WBC 


 


RBC 


 


Hgb 


 


Hct 


 


MCV 


 


MCH 


 


MCHC 


 


RDW 


 


Plt Count 


 


MPV 


 


Neut # (Auto) 


 


Lymph # (Auto) 


 


Mono # (Auto) 


 


Eos # (Auto) 


 


Baso # (Auto) 


 


Absolute Nucleated RBC 


 


Nucleated RBC % 


 


Sodium 


 


Potassium 


 


Chloride 


 


Carbon Dioxide 


 


Anion Gap 


 


BUN 


 


Creatinine 


 


Estimated GFR (MDRD) 


 


Glucose 


 


Calcium 


 


Total Bilirubin 


 


AST 


 


ALT 


 


Alkaline Phosphatase 


 


Troponin I High Sens  13.5


 


Total Protein 


 


Albumin 


 


Globulin 


 


Albumin/Globulin Ratio 


 


Lipase 


 


Urine Color 


 


Urine Clarity 


 


Urine pH 


 


Ur Specific Gravity 


 


Urine Protein 


 


Urine Glucose (UA) 


 


Urine Ketones 


 


Urine Occult Blood 


 


Urine Nitrite 


 


Urine Bilirubin 


 


Urine Urobilinogen 


 


Ur Leukocyte Esterase 


 


Urine RBC 


 


Urine WBC 


 


Ur Squamous Epith Cells 


 


Urine Bacteria 


 


Ur Microscopic Review 


 


Urine Culture Comments 














- Rads (name of study)


  ** cxr


Radiology: Prelim report reviewed, EMP read contemporaneously, See rad report 

(no acute abnormality.)





PD MEDICAL DECISION MAKING





- ED course


Complexity details: reviewed results, re-evaluated patient, considered 

differential (No ST elevation MI, no aortic dissection, no PE, no tension 

pneumothorax, no aortic aneurysm), d/w patient


ED course: 


Patient with what sounds like GI upset secondary to starting new medication.  

She is unsure what the medication is and I do not have access to her medication 

history to find out which medication could be causing this.  I recommend that 

she contact her doctor to find out what her new medication was so that this can 

be stopped and/or changed.  No evidence of acute coronary syndrome, PE.  No 

evidence of cholecystitis, pancreatitis.  Patient counseled regarding signs and 

symptoms for which I believe and urgent re-evaluation would be necessary. 

Patient with good understanding of and agreement to plan and is comfortable 

going home at this time





This document was made in part using voice recognition software. While efforts 

are made to proofread this document, sound alike and grammatical errors may 

occur.





Departure





- Departure


Disposition: 01 Home, Self Care


Clinical Impression: 


Abdominal pain


Qualifiers:


 Abdominal location: epigastric Qualified Code(s): R10.13 - Epigastric pain





Condition: Good


Instructions:  ED Abdominal Pain Unkn Cause


Follow-Up: 


Heaven Alonzo DO [Primary Care Provider] - Within 1 week


Comments: 


Your testing does not showing any acute abnormalities today.  Follow-up with 

your doctor for further care and to discuss the potential changing of your 

medications as this seems to have started after they started you on a new 

medication.  They may want to change this medication.


Discharge Date/Time: 10/29/20 12:38

## 2020-12-17 ENCOUNTER — HOSPITAL ENCOUNTER (OUTPATIENT)
Dept: HOSPITAL 76 - SC | Age: 75
Discharge: HOME | End: 2020-12-17
Attending: NURSE PRACTITIONER
Payer: MEDICARE

## 2020-12-17 VITALS — DIASTOLIC BLOOD PRESSURE: 82 MMHG | SYSTOLIC BLOOD PRESSURE: 130 MMHG

## 2020-12-17 DIAGNOSIS — G47.10: Primary | ICD-10-CM

## 2020-12-17 DIAGNOSIS — G47.8: ICD-10-CM

## 2020-12-17 DIAGNOSIS — R41.89: ICD-10-CM

## 2020-12-17 DIAGNOSIS — E66.01: ICD-10-CM

## 2020-12-17 PROCEDURE — 99203 OFFICE O/P NEW LOW 30 MIN: CPT

## 2020-12-17 PROCEDURE — 99212 OFFICE O/P EST SF 10 MIN: CPT

## 2020-12-17 NOTE — SLEEP CARE CONSULTATION
Information from patient questionnaire entered by [employer].





I have reviewed and concur with the information entered by [employer]. 

This document represents the service I personally performed and the decisions 

made by me, Lupe Sanchez ARNP.





History of Present Illness


Service Date and Time: 12/17/2020    0944


Reason for Visit: New patient


Chief Complaint: reports: Unrefreshed sleep (once in a while), Excessive daytime

sleepiness, Fatigue (sometimes), Frequent awakenings at night.  denies: 

Insomnia, Snoring (unsure, lives alone), Observed pauses in breathing (unsure, 

lives alone)


Date of Onset: 2-3 years


Usual bedtime: 11 pm


Time it takes to fall asleep: 10-15 minutes


Snores at night: No (dont know)


Observed to quit breathing while asleep: No


Sleeps alone due to snoring: No


Number of times waking at night: 2-3 


Reasons for waking at night: reports: Bathroom.  denies: Choking, Snoring, 

Gasping for air


Toss, Turn, or Twitch while sleeping: Yes


Recalls having dreams: No


Usually gets out of bed at: 7-8 am


Feels refreshed in the morning: No (sometimes)


Morning headache: No


Sleepy or fatigued during the day: Yes


Ever fallen asleep while driving: No


Takes day naps: No


Dreams during day naps: No


Prior sleep studies: No


Additional HPI information: 





I had the pleasure of seeing CRISTI FELIX today regarding the possibility of 

her having a sleep disorder. Her current complaints are frequent awakenings at 

night. She is unsure is she snores or has pauses in breathing at night during 

sleep. She lives by herself so no one is there to tell her. She is here because 

her cardiologist wanted her evaluated for sleep apnea.


 





- Parasomnia Symptoms


Ever been unable to move upon waking from sleep: No


Walks in sleep: No


Talks in sleep: No


Ever acted out dreams in sleep: No


Ever felt weak in the knees when startled or emotional: Yes


Bothered by creepy, crawly, restless sensations in legs: Yes


Problems with memory or concentration: No





Subjective


Initial [place] Sleepiness Scale score: 7 (in 2020)





Past Medical History


Past Medical History: reports: Hypertension, Diabetes, Arthritis, Coronary Heart

Disease (stent placed in July 2020), Gout, GERD.  denies: Congestive Heart 

Failure, Hypothyroidism, Anemia, Anxiety, Depression





Social History


The patient's occupation is a Retired. Patient is  /  and lives in 

[place]. 





Have you smoked in the past 12 months: No


Alcohol use: No


Caffeine use: Yes


Caffeine amount and frequency: 2 cups of coffee





Family History


Family history of sleep disordered breathing: Yes (brothers, one had surgery)


Family Hx Sleep Apnea: Sibling: Snoring, Sleep apnea - Treated





Allergies and Home Medications


Drug allergies reviewed: Yes (several as listed)


Home medication list reviewed: Yes


Allergy and home medication list: 





spironolactone


allopurinol


amlodipine


neurontin


tolterodine


rosuvastatin


magnesium


cyanocobalamin


Clopidine


aspirin


gabapentin


novolog


Lantus


Trulicity


Vit C and D


omeprazole








Review of Systems


Weight loss over past 5 years: 17


Cardiovascular: reports: high blood pressure, leg or foot swelling.  denies: 

irregular heart rate or pulse


Gastrointestinal: reports: heartburn.  denies: difficulty swallowing


Urinary: reports: incontinence


Neurological: denies: headaches, seizure


Psychiatric: denies: anxiety, depression


Ear/Nose/Throat: reports: wisdom teeth removed.  denies: nasal congestion, sinus

problems, nose bleeds, dry mouth/throat, tonsillectomy


Endocrine: reports: sluggishness, too hot or cold, increased urination


Musculoskeletal: reports: back pain


Immunologic: denies: allergies to food or environment





Physical Exam


Blood Pressure: 130/82


Cuff size: long


Heart Rate: 68


O2 Saturation: 99


Height: 5 ft


Weight: 212 lb


Body Mass Index: 41.3


BMI Classification: Morbidly Obese


Neck circumference: 14.2 (inches)


Nostrils: patent to airflow


Turbinates: swollen


Mouth and throat: narrow oropharynx


Uvula visualization: 25% Mallampati Class III


Tongue: normal in size


Tonsils: 2+


Chin and jaw: Micrognathia


Neck: normal w/o lymphadenopathy or thyromegaly


Heart: regular rate and rhythm


Lungs: clear bilaterally





Impression and Plan





1. Suspected Obstructive Sleep Apnea-Hypopnea Syndrome, as suggested by a 

history of frequent awakening during the night, unrefreshed sleep, cognitive 

impairment, and excessive daytime sleepiness. She has a history of hypertension 

and diabetes.  I reviewed with patient that a narrow oropharynx and obesity are 

common predisposing factors for obstructive sleep apnea-hypopnea syndrome. I 

recommend proceeding to polysomnography to confirm the diagnosis and to assess 

severity. If the patient has significant sleep disordered breathing, a manual 

CPAP titration study will also be performed to find the optimal treatment 

pressure. I informed the patient of what the sleep studies involve and after 

some discussion, obtained agreement to proceed. The pathophysiology of 

obstructive sleep apnea-hypopnea syndrome was discussed with the patient and 

health risks of cardiovascular and cerebrovascular disease if not treated.  

Risks of drowsy driving discussed in detail and patient advised to avoid long 

distance driving and to pull over at the first sign of drowsiness. Patient 

agreed to plan. 





* Schedule polysomnography +- manual CPAP titration study.


* Avoid long distance driving or driving when feeling sleepy.


* Avoid alcohol, sedative and muscle relaxant around bedtime.


* Attempt to lose weight.


* Review instructions provided by trained office staff on how to prepare for the

  sleep study.


* Return for follow-up after sleep study completed.








Visit Type: In Office


Time Spent with Patient (minutes): 30


Provider Statement: I spent 100% of the Face to Face Visit with the patient with

greater than 50% spent counseling the patient and coordination of care.

## 2020-12-18 ENCOUNTER — HOSPITAL ENCOUNTER (EMERGENCY)
Dept: HOSPITAL 76 - ED | Age: 75
Discharge: HOME | End: 2020-12-18
Payer: MEDICARE

## 2020-12-18 VITALS — SYSTOLIC BLOOD PRESSURE: 133 MMHG | DIASTOLIC BLOOD PRESSURE: 77 MMHG

## 2020-12-18 DIAGNOSIS — E11.51: ICD-10-CM

## 2020-12-18 DIAGNOSIS — E11.65: Primary | ICD-10-CM

## 2020-12-18 DIAGNOSIS — Z79.02: ICD-10-CM

## 2020-12-18 DIAGNOSIS — R35.0: ICD-10-CM

## 2020-12-18 DIAGNOSIS — Z79.4: ICD-10-CM

## 2020-12-18 DIAGNOSIS — I10: ICD-10-CM

## 2020-12-18 DIAGNOSIS — Z79.82: ICD-10-CM

## 2020-12-18 LAB
ANION GAP SERPL CALCULATED.4IONS-SCNC: 12 MMOL/L (ref 6–13)
BASOPHILS NFR BLD AUTO: 0 10^3/UL (ref 0–0.1)
BASOPHILS NFR BLD AUTO: 0.5 %
BUN SERPL-MCNC: 23 MG/DL (ref 6–20)
CALCIUM UR-MCNC: 9.9 MG/DL (ref 8.5–10.3)
CHLORIDE SERPL-SCNC: 99 MMOL/L (ref 101–111)
CLARITY UR REFRACT.AUTO: CLEAR
CO2 SERPL-SCNC: 23 MMOL/L (ref 21–32)
CREAT SERPLBLD-SCNC: 1.1 MG/DL (ref 0.4–1)
EOSINOPHIL # BLD AUTO: 0.3 10^3/UL (ref 0–0.7)
EOSINOPHIL NFR BLD AUTO: 3.5 %
ERYTHROCYTE [DISTWIDTH] IN BLOOD BY AUTOMATED COUNT: 13.2 % (ref 12–15)
GLUCOSE SERPL-MCNC: 320 MG/DL (ref 70–100)
GLUCOSE UR QL STRIP.AUTO: 500 MG/DL
HGB UR QL STRIP: 13.5 G/DL (ref 12–16)
KETONES SERPL STRIP-SCNC: NEGATIVE MMOL/L
KETONES UR QL STRIP.AUTO: NEGATIVE MG/DL
LYMPHOCYTES # SPEC AUTO: 1.8 10^3/UL (ref 1.5–3.5)
LYMPHOCYTES NFR BLD AUTO: 24.3 %
MAGNESIUM SERPL-MCNC: 2.3 MG/DL (ref 1.7–2.8)
MCH RBC QN AUTO: 28.2 PG (ref 27–31)
MCHC RBC AUTO-ENTMCNC: 31.8 G/DL (ref 32–36)
MCV RBC AUTO: 88.7 FL (ref 81–99)
MONOCYTES # BLD AUTO: 0.5 10^3/UL (ref 0–1)
MONOCYTES NFR BLD AUTO: 7.1 %
NEUTROPHILS # BLD AUTO: 4.8 10^3/UL (ref 1.5–6.6)
NEUTROPHILS # SNV AUTO: 7.5 X10^3/UL (ref 4.8–10.8)
NEUTROPHILS NFR BLD AUTO: 64.5 %
NITRITE UR QL STRIP.AUTO: NEGATIVE
PDW BLD AUTO: 9.8 FL (ref 7.9–10.8)
PH UR STRIP.AUTO: 7 PH (ref 5–7.5)
PLATELET # BLD: 265 10^3/UL (ref 130–450)
PROT UR STRIP.AUTO-MCNC: NEGATIVE MG/DL
RBC # UR STRIP.AUTO: NEGATIVE /UL
RBC MAR: 4.79 10^6/UL (ref 4.2–5.4)
SODIUM SERPLBLD-SCNC: 134 MMOL/L (ref 135–145)
SP GR UR STRIP.AUTO: 1.02 (ref 1–1.03)
UROBILINOGEN UR QL STRIP.AUTO: (no result) E.U./DL
UROBILINOGEN UR STRIP.AUTO-MCNC: NEGATIVE MG/DL

## 2020-12-18 PROCEDURE — 99283 EMERGENCY DEPT VISIT LOW MDM: CPT

## 2020-12-18 PROCEDURE — 83735 ASSAY OF MAGNESIUM: CPT

## 2020-12-18 PROCEDURE — 82009 KETONE BODYS QUAL: CPT

## 2020-12-18 PROCEDURE — 81001 URINALYSIS AUTO W/SCOPE: CPT

## 2020-12-18 PROCEDURE — 80048 BASIC METABOLIC PNL TOTAL CA: CPT

## 2020-12-18 PROCEDURE — 36415 COLL VENOUS BLD VENIPUNCTURE: CPT

## 2020-12-18 PROCEDURE — 85025 COMPLETE CBC W/AUTO DIFF WBC: CPT

## 2020-12-18 PROCEDURE — 81003 URINALYSIS AUTO W/O SCOPE: CPT

## 2020-12-18 PROCEDURE — 99284 EMERGENCY DEPT VISIT MOD MDM: CPT

## 2020-12-18 PROCEDURE — 87086 URINE CULTURE/COLONY COUNT: CPT

## 2021-01-11 ENCOUNTER — HOSPITAL ENCOUNTER (OUTPATIENT)
Dept: HOSPITAL 76 - LAB.WCP | Age: 76
Discharge: HOME | End: 2021-01-11
Attending: FAMILY MEDICINE
Payer: MEDICARE

## 2021-01-11 DIAGNOSIS — E78.2: ICD-10-CM

## 2021-01-11 DIAGNOSIS — I10: ICD-10-CM

## 2021-01-11 DIAGNOSIS — E11.9: Primary | ICD-10-CM

## 2021-01-11 LAB
ALBUMIN DIAFP-MCNC: 4 G/DL (ref 3.2–5.5)
ALBUMIN/GLOB SERPL: 1.2 {RATIO} (ref 1–2.2)
ALP SERPL-CCNC: 75 IU/L (ref 42–121)
ALT SERPL W P-5'-P-CCNC: 17 IU/L (ref 10–60)
ANION GAP SERPL CALCULATED.4IONS-SCNC: 7 MMOL/L (ref 6–13)
AST SERPL W P-5'-P-CCNC: 16 IU/L (ref 10–42)
BASOPHILS NFR BLD AUTO: 0 10^3/UL (ref 0–0.1)
BASOPHILS NFR BLD AUTO: 0.4 %
BILIRUB BLD-MCNC: 0.6 MG/DL (ref 0.2–1)
BUN SERPL-MCNC: 36 MG/DL (ref 6–20)
CALCIUM UR-MCNC: 10.6 MG/DL (ref 8.5–10.3)
CHLORIDE SERPL-SCNC: 104 MMOL/L (ref 101–111)
CHOLEST SERPL-MCNC: 127 MG/DL
CO2 SERPL-SCNC: 26 MMOL/L (ref 21–32)
CREAT SERPLBLD-SCNC: 1.4 MG/DL (ref 0.4–1)
CREAT UR-SCNC: 91.8 MG/DL
EOSINOPHIL # BLD AUTO: 0.3 10^3/UL (ref 0–0.7)
EOSINOPHIL NFR BLD AUTO: 3.4 %
ERYTHROCYTE [DISTWIDTH] IN BLOOD BY AUTOMATED COUNT: 13.7 % (ref 12–15)
GLOBULIN SER-MCNC: 3.4 G/DL (ref 2.1–4.2)
GLUCOSE SERPL-MCNC: 204 MG/DL (ref 70–100)
HBA1C MFR BLD HPLC: 9.2 % (ref 4.27–6.07)
HDLC SERPL-MCNC: 43 MG/DL
HDLC SERPL: 3 {RATIO} (ref ?–4.4)
HGB UR QL STRIP: 14.3 G/DL (ref 12–16)
LDLC SERPL CALC-MCNC: 63 MG/DL
LDLC/HDLC SERPL: 1.5 {RATIO} (ref ?–4.4)
LYMPHOCYTES # SPEC AUTO: 2.1 10^3/UL (ref 1.5–3.5)
LYMPHOCYTES NFR BLD AUTO: 21.3 %
MCH RBC QN AUTO: 28.3 PG (ref 27–31)
MCHC RBC AUTO-ENTMCNC: 32.7 G/DL (ref 32–36)
MCV RBC AUTO: 86.5 FL (ref 81–99)
MICROALBUMIN UR-MCNC: 19.5 MG/DL (ref 0–300)
MICROALBUMIN/CREAT RATIO PNL UR: 212.4 UG/MG (ref ?–30)
MONOCYTES # BLD AUTO: 0.7 10^3/UL (ref 0–1)
MONOCYTES NFR BLD AUTO: 6.7 %
NEUTROPHILS # BLD AUTO: 6.6 10^3/UL (ref 1.5–6.6)
NEUTROPHILS # SNV AUTO: 9.7 X10^3/UL (ref 4.8–10.8)
NEUTROPHILS NFR BLD AUTO: 67.8 %
PDW BLD AUTO: 10.2 FL (ref 7.9–10.8)
PLATELET # BLD: 291 10^3/UL (ref 130–450)
PROT SPEC-MCNC: 7.4 G/DL (ref 6.7–8.2)
RBC MAR: 5.05 10^6/UL (ref 4.2–5.4)
SODIUM SERPLBLD-SCNC: 137 MMOL/L (ref 135–145)
VLDLC SERPL-SCNC: 21 MG/DL

## 2021-01-11 PROCEDURE — 82043 UR ALBUMIN QUANTITATIVE: CPT

## 2021-01-11 PROCEDURE — 80061 LIPID PANEL: CPT

## 2021-01-11 PROCEDURE — 83036 HEMOGLOBIN GLYCOSYLATED A1C: CPT

## 2021-01-11 PROCEDURE — 83721 ASSAY OF BLOOD LIPOPROTEIN: CPT

## 2021-01-11 PROCEDURE — 36415 COLL VENOUS BLD VENIPUNCTURE: CPT

## 2021-01-11 PROCEDURE — 80053 COMPREHEN METABOLIC PANEL: CPT

## 2021-01-11 PROCEDURE — 85025 COMPLETE CBC W/AUTO DIFF WBC: CPT

## 2021-01-11 PROCEDURE — 83735 ASSAY OF MAGNESIUM: CPT

## 2021-01-11 PROCEDURE — 82570 ASSAY OF URINE CREATININE: CPT

## 2021-02-02 ENCOUNTER — HOSPITAL ENCOUNTER (OUTPATIENT)
Dept: HOSPITAL 76 - SC | Age: 76
Discharge: HOME | End: 2021-02-02
Attending: NURSE PRACTITIONER
Payer: MEDICARE

## 2021-02-02 DIAGNOSIS — E66.9: ICD-10-CM

## 2021-02-02 DIAGNOSIS — G47.61: Primary | ICD-10-CM

## 2021-02-02 PROCEDURE — 95810 POLYSOM 6/> YRS 4/> PARAM: CPT

## 2021-02-09 ENCOUNTER — HOSPITAL ENCOUNTER (OUTPATIENT)
Dept: HOSPITAL 76 - SC | Age: 76
Discharge: HOME | End: 2021-02-09
Attending: NURSE PRACTITIONER
Payer: MEDICARE

## 2021-02-09 DIAGNOSIS — G47.61: Primary | ICD-10-CM

## 2021-02-09 DIAGNOSIS — G47.8: ICD-10-CM

## 2021-02-09 DIAGNOSIS — E66.01: ICD-10-CM

## 2021-02-09 DIAGNOSIS — G47.10: ICD-10-CM

## 2021-02-09 DIAGNOSIS — R06.83: ICD-10-CM

## 2021-02-09 PROCEDURE — 99213 OFFICE O/P EST LOW 20 MIN: CPT

## 2021-02-09 PROCEDURE — 99212 OFFICE O/P EST SF 10 MIN: CPT

## 2021-02-09 NOTE — SLEEP CARE CONSULTATION
Information from patient questionnaire entered by [employer].





I have reviewed and concur with the information entered by [employer]. 

This document represents the service I personally performed and the decisions 

made by me, Lupe Sanchez ARNP.





History of Present Illness


Service Date and Time: 02/09/2021    1040


Initial [place] Sleepiness Scale score: 7 (in 2020)


Current [place] Sleepiness Scale score: 6


Additional HPI information: 





CRISTI FELIX returns for follow up and results of the recently performed 

polysomnography.





The patient was informed of the following findings: no significant sleep 

disordered breathing with an average AHI of 4.7 and a mraia t oxygen saturation of

89%. She did not sleep supine and her non-supine AHI of 5.27. She had severe 

PLMs. 





I explained the pathophysiology behind obstructive sleep apnea. Patient does not

have sleep apnea and was advised how weight gain could increase the risk of 

developing sleep apnea in the future. I strongly encouraged the patient to lose 

weight.





Patient has light snoring. Snoring can be reduced by weight loss. Weight loss is

best achieved with diet consult. Patient instructed to contact PCP for referral.

Snoring can also be treated with an oral appliance from a dentist. Advised to 

check insurance coverage. In addition, an ENT evaluation can be do to see if 

other treatment is indicated. Patient counseled not drink alcohol less than 4 

hours before bedtime as it can increase snoring and apnea.  Patient was 

cautioned about risks of drowsy driving until sleepiness symptoms resolve. 





Sleep Study





- Results


Type of Sleep Study: Polysomnography


Prior sleep studies: No


Polysomnography/Home Sleep Study results: 





IMPRESSION: The quality of the study is good. The patient had poor sleep 

efficiency due to sleep onset insomnia


and several prolonged awakenings during the night. The sleep architecture was 

abnormal for lack of REM sleep.


Respiratory monitoring showed no significant sleep disordered breathing (AHI = 

4.7) or hypoxia (maria t oxygen


saturation of 89%). The patient did not sleep supine during this study (supine 

AHI = 0.0; non-supine = 5.27).


Snore was infrequent and light in intensity. There was severe periodic leg 

movement of sleep not associated with


sleep fragmentation. Cardiac rhythm was normal sinus rhythm without significant 

arrhythmia. No abnormal


behavior (parasomnia) observed during the night.





Allergies and Home Medications


Home medication list reviewed: Yes (no changes)





Review of Systems


Review of systems same as previous: Yes (no changes)





Physical Exam


Heart Rate: 72


O2 Saturation: 98


Height: 5 ft


Weight: 213 lb


Body Mass Index: 41.5


BMI Classification: Morbidly Obese





Impression and Plan





1. Suspected Obstructive Sleep Apnea-Hypopnea Syndrome, as suggested by a 

history of frequent awakening during the night, unrefreshed sleep, and excessive

daytime sleepiness. She did not sleep supine during her PSG and thus we got a 

suboptimal result. I advised patient that we can obtain another study at home 

and have her sleep on her back more so we can get a measurement of possible 

apneas on her back. I recommend proceeding to polysomnography to confirm the 

diagnosis and to assess severity. If the patient has significant sleep 

disordered breathing, a manual CPAP titration study will also be performed to 

find the optimal treatment pressure. I obtained agreement to proceed. The 

pathophysiology of obstructive sleep apnea-hypopnea syndrome was discussed with 

the patient and health risks of cardiovascular and cerebrovascular disease if 

not treated. Risks of drowsy driving discussed in detail and patient advised to 

avoid long distance driving and to pull over at the first sign of drowsiness. 

Patient agreed to plan. 





2. Periodic limb movement, severe, that did not fragment patients sleep. 

Periodic limb movement of sleep (PLMS) is characterized by episodes of 

repetitive limb movements that occur during sleep and usually involve the lower 

limbs. The etiology is unknown but can be associated with restless leg syndrome 

(RLS), neuropathy, spinal cord diseases, kidney disease, rheumatological 

disorders, narcolepsy, obstructive sleep apnea, and REM sleep behavior disorder.

Other factors that can increase PLMS and/or RLS are heredity and iron deficiency

as reflected by a low serum ferritin level below 50 to 75mcg / L. Several 

medications can precipitate or aggravate PLMS such as selective serotonin re-

uptake inhibitor antidepressants, tricyclic antidepressants, lithium, and 

dopamine  receptor antagonists with the exception of bupropion. Caffeine can 

also aggravate PLMS and should be avoided. Sleep hygiene methods can also 

improve sleep as well as lifestyle changes such as regular exercise. Patient was

advised that no treatment is needed at this time. If symptoms increase, then 

further evaluation is indicated.





* Schedule polysomnography/HST due to suboptimal PSG.


* She is to sleep more on her back during the HST


* Avoid long distance driving or driving when feeling sleepy.


* Avoid alcohol, sedative and muscle relaxant around bedtime.


* Attempt to lose weight.


* Review instructions provided by trained office staff on how to prepare for the

  sleep study.


* Return for follow-up after sleep study completed.











Counseling Topics: Weight loss health impact


Visit Type: In Office


Time Spent with Patient (minutes): 23


Provider Statement: I spent 100% of the Face to Face Visit with the patient with

greater than 50% spent counseling the patient and coordination of care.

## 2021-02-18 ENCOUNTER — HOSPITAL ENCOUNTER (OUTPATIENT)
Dept: HOSPITAL 76 - DI.N | Age: 76
Discharge: HOME | End: 2021-02-18
Attending: ORTHOPAEDIC SURGERY
Payer: MEDICARE

## 2021-02-18 DIAGNOSIS — M13.861: Primary | ICD-10-CM

## 2021-02-18 NOTE — XRAY REPORT
PROCEDURE:  Knee 4 View RT

 

INDICATIONS:  ARTHRITIS, R KNEE

 

TECHNIQUE:  4 views of the right knee(s) were acquired.  

 

COMPARISON:  X-ray right knee 10/4/2016

 

FINDINGS:  

 

Bones:  No fractures or dislocations.  No suspicious bony lesions.  Moderate medial and patellofemora
l compartment narrowing, slightly progressive compared to prior exam. Mild periarticular osteophytes 
without erosions.

 

Soft tissues:  No joint effusion.  No suspicious soft tissue calcifications.  

 

IMPRESSION:  Moderate medial patellofemoral compartment narrowing suggestive of arthritis, slightly p
rogressive.

 

Reviewed by: Lakisha Pimentel MD on 2/18/2021 1:30 PM PST

Approved by: Lakisha Pimentel MD on 2/18/2021 1:30 PM PST

 

 

Station ID:  [id number]

## 2021-03-04 ENCOUNTER — HOSPITAL ENCOUNTER (OUTPATIENT)
Dept: HOSPITAL 76 - SC | Age: 76
Discharge: HOME | End: 2021-03-04
Attending: NURSE PRACTITIONER
Payer: MEDICARE

## 2021-03-04 DIAGNOSIS — G47.33: Primary | ICD-10-CM

## 2021-03-04 DIAGNOSIS — R09.02: ICD-10-CM

## 2021-03-04 DIAGNOSIS — E66.9: ICD-10-CM

## 2021-03-04 PROCEDURE — 95806 SLEEP STUDY UNATT&RESP EFFT: CPT

## 2021-03-12 ENCOUNTER — HOSPITAL ENCOUNTER (OUTPATIENT)
Dept: HOSPITAL 76 - SC | Age: 76
Discharge: HOME | End: 2021-03-12
Attending: NURSE PRACTITIONER
Payer: MEDICARE

## 2021-03-12 DIAGNOSIS — G47.33: Primary | ICD-10-CM

## 2021-03-12 DIAGNOSIS — E66.9: ICD-10-CM

## 2021-03-12 DIAGNOSIS — R09.02: ICD-10-CM

## 2021-03-12 PROCEDURE — 99212 OFFICE O/P EST SF 10 MIN: CPT

## 2021-03-12 PROCEDURE — 99213 OFFICE O/P EST LOW 20 MIN: CPT

## 2021-03-12 NOTE — SLEEP CARE CONSULTATION
Information from patient questionnaire entered by [employer].





I have reviewed and concur with the information entered by [employer]. 

This document represents the service I personally performed and the decisions 

made by me, Lupe Sanchez ARNP.





History of Present Illness


Service Date and Time: 03/12/2021    1100


Initial [place] Sleepiness Scale score: 7 (in 2020)


Current [place] Sleepiness Scale score: 5


Additional HPI information: 





CRISTI FELIX returns for follow up and results of the recently performed home 

sleep study. She was found to have moderate obstructive sleep apnea. 





I explained the pathophysiology behind obstructive sleep apnea. We then spent 

quite a bit of time discussing different treatment options.  For mild 

obstructive sleep apnea, surgery and oral appliance are alternatives to nasal 

CPAP therapy but in moderate or severe cases, nasal CPAP is the most effective 

and reliable treatment.   





Because apnea is primarily in supine position, then positional management 

therapy could be effective. Methods discussed such as positioning with pillows, 

using a T-shirt with tennis balls in the back, and shown commercial products 

that have a pillow format on back to prevent supine sleep. I reviewed the impact

of weight changes on sleep apnea and strongly recommended losing weight. After 

some discussion, the patient opted to go with the nasal CPAP therapy.  Nasal 

autoCPAP set at 4-15 cmH20 will be ordered with rationale explained. A manual 

titration study will be ordered if unable to find optimal pressure with office 

adjustments. 





I explained how CPAP machine works with sample devices Respironics Dreamstation 

and ResMed [id number] and what to expect when using the machine.  Using CPAP 

every night in order to get used to it was emphasized.  Patient advised to put 

CPAP mask on before getting into bed so as not to fall asleep without CPAP.  To 

assist acclimation to CPAP use, it could also be used for a short time during 

day while reading or watching TV. The patient was instructed to call the CPAP 

supplier to discuss any mechanical problem that may occur. If the mask given is 

uncomfortable or is difficult to keep on through the night even with adjustment,

contact the CPAP supplier as many will replace with another mask style if 

notified before  30 days.  If snoring or perceives is not getting enough air or 

too much air from the machine, notify this office.  [facility] patient education PAP 

tips reviewed and given to patient. 





Sleep Study





- Results


Type of Sleep Study: Home sleep study


Prior sleep studies: No


Polysomnography/Home Sleep Study results: 





Physician Impression:


The quality of the study is good. The length of the study is adequate (> 240 

minutes). Please also see the


tabulated and graphic data.





1. Obstructive Sleep Apnea-Hypopnea (ICD-10 G47.33), moderate, with an AHI of 

23.8/hr and maria t


SaO2 of 83%. During the study, the patient had 116 apneas (116 obstructive, 0 

central, 0 mixed) and 73


hypopneas. The longest episode lasted 62.5 seconds. The respiratory events 

occurred more frequently


during supine sleep (supine AHI was 37.0 and non-supine, 10.02).


2. Hypoxemia (ICD-10 R09.02), mild, with the lowest oxygen saturation of 83 % 

and 41.3 minutes with


SaO2 under 90%. Baseline oxygen saturation was normal (Average oxygen saturation

was 92%).





Physical Exam


Heart Rate: 70


O2 Saturation: 99


Height: 5 ft


Weight: 204 lb


Body Mass Index: 39.8


BMI Classification: Obese





Impression and Plan





1. Obstructive Sleep Apnea-Hypopnea Syndrome, moderate, with lowest oxygen 

saturation of 83%. Obviously this is the cause of the patients symptoms of 

unrefreshed sleep, and excessive daytime sleepiness. Positive pressure therapy 

could benefit hypertension, diabetes, cardiac disease and gastric reflux.  As 

mentioned above, the patient will be started on nasal autoCPAP therapy with 

pressure set at 4-15 cmH2O. A manual titration study will be completed if unable

to find optimal treatment pressure with office adjustments. Compliance 

guidelines also reviewed. A copy of compliance guidelines will be given for 

reference at check out. Because the apnea is more severe supine, I instructed to

avoid sleeping supine using pillow positioning until able to start CPAP use. 





2. Hypoxemia, mild, with the lowest oxygen saturation of 83 % and 41.3 minutes 

with SaO2 under 90%. Her baseline oxygen saturation was normal with an average 

oxygen saturation at 92%.





* Nasal auto CPAP therapy, pressure at 4-15 cm H2O.


* Attempt to lose weight.


* Avoid alcohol consumption near bedtime.


* Avoid supine sleep until using CPAP.


* The patient is again cautioned about driving until sleepiness completely 

  resolves.


* Return one month after CPAP obtained. I will assess response to therapy and 

  compliance at that time.





Counseling Topics: Weight loss health impact


Visit Type: In Office


Time Spent with Patient (minutes): 21


Provider Statement: I spent 100% of the Face to Face Visit with the patient with

greater than 50% spent counseling the patient and coordination of care.

## 2021-04-27 ENCOUNTER — HOSPITAL ENCOUNTER (OUTPATIENT)
Dept: HOSPITAL 76 - SC | Age: 76
Discharge: HOME | End: 2021-04-27
Attending: NURSE PRACTITIONER
Payer: MEDICARE

## 2021-04-27 DIAGNOSIS — G47.33: Primary | ICD-10-CM

## 2021-04-27 DIAGNOSIS — E66.01: ICD-10-CM

## 2021-04-27 PROCEDURE — 99212 OFFICE O/P EST SF 10 MIN: CPT

## 2021-04-27 PROCEDURE — 99213 OFFICE O/P EST LOW 20 MIN: CPT

## 2021-04-27 NOTE — SLEEP CARE CONSULTATION
Information from patient questionnaire entered by [employer].





I have reviewed and concur with the information entered by [employer]. 

This document represents the service I personally performed and the decisions 

made by me, Lupe Sanchez ARNP.





History of Present Illness


Service Date and Time: 04/27/2021    1140


Previous diagnosis: Moderate, Obstructive Sleep Apnea-Hypopnea Syndrome


AHI: 23.8 (in 2021)


Reason for follow up: first compliance


Equipment type: CPAP


Equipment obtained from: [facility] (getting supplies as needed)


Mask style: Nasal


Backup mask available: No (will keep old mask when replaced)


Last cushion change: 1 month


Prior sleep studies: Yes


Year and Where: 2021 - [facility]


Type of Sleep Study: Home sleep study


HPI additional information: 





DANIELA FELIX was diagnosed to have moderate, AHI 23.8, obstructive sleep apnea-

hypopnea syndrome and returned today for CPAP therapy first compliance follow-

up.





CPAP Compliance Data





- Data Reviewed with Patient


Average duration of nightly device use: 2 hr 22 min


Compliance rate %: 10


Current pressure setting (cmH2O): 4-8 (median 4.7, avg 6.0, max 7.8)


Humidity setting: 3


Heated hose setting: 3


Average residual AHI: 9.7


Central apnea: 0.4


Obstructive apnea: 4.1


Hypopnea: 5.3


Average large leak: 46 min 42 sec





Subjective


Missed days of use due to: reports: travel


Patient concerns: reports: mask leak noise, dry mouth, nose, throat (dry nose). 

denies: aerophagia, mask discomfort, air blowing in eyes, condensation in 

mask/hose, nasal congestion, epistaxis, other


Observed to snore while using device: No (don't know)


Current pressure setting perceived as: too high


On therapy, patient: reports: other (She doesn't see any difference).  denies: 

sleeping better, awakening more refreshed, being more awake and alert during the

day, more rested overall, drowsiness while driving


Initial [place] Sleepiness Scale score: 7 (in 2020)


Current [place] Sleepiness Scale score: 5





Allergies and Home Medications


Home medication list reviewed: Yes (no new meds)





Review of Systems


Review of systems same as previous: Yes (no changes)





Physical Exam


Heart Rate: 82


O2 Saturation: 98


Height: 5 ft


Weight: 206 lb


Body Mass Index: 40.2


BMI Classification: Morbidly Obese





Impression and Plan





1. Obstructive Sleep Apnea-Hypopnea Syndrome, mild, with poor treatment 

compliance and fair apnea control with mildly elevated residual AHI. On CPAP 

therapy, the patient does not feel that she has better sleep quality or more 

rested overall but has not been able to use full due to intolerance of pressure 

and mask. Daniela has felt that the pressure is too much. She states the mask 

will fold up under her nose and then she is not able to breathe through her 

nose. She may do better in a different mask. I will write for a mask refitting 

and encouraged her to try a full face mask because the pressure will feel 

reduced and may be a bit better tolerated. The patients pressure will be 

adjusted to autoCPAP 5-8 cmH20 to try to reduce AHI. Patient advised to contact 

me if pressure change is uncomfortable so that it can be adjusted. Goals for 

apnea control discussed. Patient's apnea severity and rationale for treatment to

reduce apnea, improve sleep quality and reduce cardiovascular and cerebrovas

cular events was reviewed. I also reviewed the benefit of consistent device use 

of CPAP for hypertension, cardiac disease, diabetes,  and gastric reflux.





* Change auto CPAP pressure to 5-8  cmH2O


* Mask refitting, possibly try a full face mask


* Notify me if snoring with mask or feeling that the pressure is too much or too

  little


* Attempt to lose weight


* Call this office if any problems using CPAP


* Return for follow up in  1-2 months , or sooner if concerns arise





Counseling Topics: Spare mask, Weight loss health impact


Visit Type: In Office


Time Spent with Patient (minutes): 25


Provider Statement: I spent 100% of the Face to Face Visit with the patient with

greater than 50% spent counseling the patient and coordination of care.

## 2021-05-13 ENCOUNTER — HOSPITAL ENCOUNTER (OUTPATIENT)
Dept: HOSPITAL 76 - LAB.WCP | Age: 76
Discharge: HOME | End: 2021-05-13
Attending: FAMILY MEDICINE
Payer: MEDICARE

## 2021-05-13 DIAGNOSIS — E11.8: Primary | ICD-10-CM

## 2021-05-13 LAB
ALBUMIN DIAFP-MCNC: 3.9 G/DL (ref 3.2–5.5)
ALBUMIN/GLOB SERPL: 1.3 {RATIO} (ref 1–2.2)
ALP SERPL-CCNC: 71 IU/L (ref 42–121)
ALT SERPL W P-5'-P-CCNC: 14 IU/L (ref 10–60)
ANION GAP SERPL CALCULATED.4IONS-SCNC: 8 MMOL/L (ref 6–13)
AST SERPL W P-5'-P-CCNC: 14 IU/L (ref 10–42)
BASOPHILS NFR BLD AUTO: 0.1 10^3/UL (ref 0–0.1)
BASOPHILS NFR BLD AUTO: 0.6 %
BILIRUB BLD-MCNC: 1 MG/DL (ref 0.2–1)
BUN SERPL-MCNC: 35 MG/DL (ref 6–20)
CALCIUM UR-MCNC: 9.8 MG/DL (ref 8.5–10.3)
CHLORIDE SERPL-SCNC: 103 MMOL/L (ref 101–111)
CHOLEST SERPL-MCNC: 129 MG/DL
CO2 SERPL-SCNC: 28 MMOL/L (ref 21–32)
CREAT SERPLBLD-SCNC: 1.1 MG/DL (ref 0.4–1)
EOSINOPHIL # BLD AUTO: 0.5 10^3/UL (ref 0–0.7)
EOSINOPHIL NFR BLD AUTO: 5.9 %
ERYTHROCYTE [DISTWIDTH] IN BLOOD BY AUTOMATED COUNT: 14.2 % (ref 12–15)
EST. AVERAGE GLUCOSE BLD GHB EST-MCNC: 200 MG/DL (ref 70–100)
GFRSERPLBLD MDRD-ARVRAT: 48 ML/MIN/{1.73_M2} (ref 89–?)
GLOBULIN SER-MCNC: 3.1 G/DL (ref 2.1–4.2)
GLUCOSE SERPL-MCNC: 207 MG/DL (ref 70–100)
HBA1C MFR BLD HPLC: 8.6 % (ref 4.27–6.07)
HCT VFR BLD AUTO: 42.4 % (ref 37–47)
HDLC SERPL-MCNC: 49 MG/DL
HDLC SERPL: 2.6 {RATIO} (ref ?–4.4)
HGB UR QL STRIP: 13.5 G/DL (ref 12–16)
LDLC SERPL CALC-MCNC: 61 MG/DL
LDLC/HDLC SERPL: 1.2 {RATIO} (ref ?–4.4)
LYMPHOCYTES # SPEC AUTO: 2.6 10^3/UL (ref 1.5–3.5)
LYMPHOCYTES NFR BLD AUTO: 31.8 %
MCH RBC QN AUTO: 28.5 PG (ref 27–31)
MCHC RBC AUTO-ENTMCNC: 31.8 G/DL (ref 32–36)
MCV RBC AUTO: 89.5 FL (ref 81–99)
MONOCYTES # BLD AUTO: 0.7 10^3/UL (ref 0–1)
MONOCYTES NFR BLD AUTO: 8.1 %
NEUTROPHILS # BLD AUTO: 4.3 10^3/UL (ref 1.5–6.6)
NEUTROPHILS # SNV AUTO: 8 X10^3/UL (ref 4.8–10.8)
NEUTROPHILS NFR BLD AUTO: 53.5 %
NRBC # BLD AUTO: 0 /100WBC
NRBC # BLD AUTO: 0 X10^3/UL
PDW BLD AUTO: 10.1 FL (ref 7.9–10.8)
PLATELET # BLD: 267 10^3/UL (ref 130–450)
POTASSIUM SERPL-SCNC: 4.7 MMOL/L (ref 3.5–5)
PROT SPEC-MCNC: 7 G/DL (ref 6.7–8.2)
RBC MAR: 4.74 10^6/UL (ref 4.2–5.4)
SODIUM SERPLBLD-SCNC: 139 MMOL/L (ref 135–145)
TRIGL P FAST SERPL-MCNC: 97 MG/DL
VLDLC SERPL-SCNC: 19 MG/DL

## 2021-05-13 PROCEDURE — 83036 HEMOGLOBIN GLYCOSYLATED A1C: CPT

## 2021-05-13 PROCEDURE — 84443 ASSAY THYROID STIM HORMONE: CPT

## 2021-05-13 PROCEDURE — 85025 COMPLETE CBC W/AUTO DIFF WBC: CPT

## 2021-05-13 PROCEDURE — 83721 ASSAY OF BLOOD LIPOPROTEIN: CPT

## 2021-05-13 PROCEDURE — 36415 COLL VENOUS BLD VENIPUNCTURE: CPT

## 2021-05-13 PROCEDURE — 80053 COMPREHEN METABOLIC PANEL: CPT

## 2021-05-13 PROCEDURE — 80061 LIPID PANEL: CPT

## 2021-06-08 ENCOUNTER — HOSPITAL ENCOUNTER (OUTPATIENT)
Dept: HOSPITAL 76 - SC | Age: 76
Discharge: HOME | End: 2021-06-08
Attending: NURSE PRACTITIONER
Payer: MEDICARE

## 2021-06-08 DIAGNOSIS — E66.01: ICD-10-CM

## 2021-06-08 DIAGNOSIS — G47.33: Primary | ICD-10-CM

## 2021-06-08 PROCEDURE — 99212 OFFICE O/P EST SF 10 MIN: CPT

## 2021-06-08 PROCEDURE — 99213 OFFICE O/P EST LOW 20 MIN: CPT

## 2021-06-08 NOTE — SLEEP CARE CONSULTATION
Information from patient questionnaire entered by Unique Tony.





I have reviewed and concur with the information entered by Unique Tony. 

This document represents the service I personally performed and the decisions 

made by me, Lupe Sanchez ARNP.





History of Present Illness


Service Date and Time: 06/08/2021    1257


Previous diagnosis: Moderate, Obstructive Sleep Apnea-Hypopnea Syndrome


AHI: 23.8 (in 2021)(21.9 in 2011)


Reason for follow up: other (6 week)


Equipment type: CPAP


Equipment obtained from: [employer] (getting supplies as needed)


Mask style: Nasal


Backup mask available: No (will keep old mask when replaced)


Last cushion change: 2-3 days ago


Prior sleep studies: Yes


Year and Where: 2021 and 2011 ([facility]) - [facility]


Type of Sleep Study: Home sleep study


HPI additional information: 





CRISTI FELIX was diagnosed to have moderate, AHI 23.8, obstructive sleep apnea-

hypopnea syndrome and returned today for CPAP therapy six week follow-up.





CPAP Compliance Data





- Data Reviewed with Patient


Average duration of nightly device use: 3 hr 33 min


Compliance rate %: 31 (42 days)


Current pressure setting (cmH2O): 5-8


Humidity setting: 3


Heated hose setting: 3


Average residual AHI: 7.4


Average large leak: 43 min 43 sec





Subjective


Patient concerns: reports: mask discomfort, mask leak noise (once in a while it 

will whistle), condensation in mask/hose (here and there, not all the time), dry

mouth, nose, throat (dry mouth sometimes).  denies: aerophagia, air blowing in 

eyes, nasal congestion, epistaxis, other


Observed to snore while using device: No


Current pressure setting perceived as: comfortable


On therapy, patient: reports: other (She feels like she can sleep better without

the machine).  denies: sleeping better, more rested overall, drowsiness while 

driving


Initial [place] Sleepiness Scale score: 7 (in 2020)


Current [place] Sleepiness Scale score: 2





Allergies and Home Medications


Home medication list reviewed: Yes (no changes)





Review of Systems


Review of systems same as previous: Yes (no changes)





Physical Exam


Heart Rate: 67


O2 Saturation: 98


Height: 5 ft


Weight: 206 lb


Body Mass Index: 40.2


BMI Classification: Morbidly Obese





Impression and Plan





1. Obstructive Sleep Apnea-Hypopnea Syndrome, moderate, with poor treatment 

compliance and fair apnea control with elevated residual AHI. On CPAP therapy, 

the patient states she is not sure if she is feeling much different from when 

she was not using the CPAP.  She thinks she sleeps better without it than with 

it.  I discussed with her that her [place] sleepiness scale has gone down from 7

to 2 which does show some improvement in her sleepiness during the day. The 

patients pressure will be changed to autoCPAP 5-8.5 cmH20 for elevation of 

residual AHI. Patient advised to contact me if pressure change is uncomfortable 

so that it can be adjusted. Goals for apnea control discussed. Feeling more 

rested refreshed patient difficulty finding the need for mask on her face, on 

her forehead or just puffiness that she cannot breathe through her nose.  She 

would like to explore other options for masks.  I will write for a mask 

refitting and encouraged her to try a full face mask.  We discussed changing her

pressure and she would like to go up in small increments for comfort.  I will 

adjust as listed above and she will notify us if this is uncomfortable. 

Patient's apnea severity and rationale for treatment to reduce apnea, improve 

sleep quality and reduce cardiovascular and cerebrovascular events was reviewed.

I also reviewed the benefit of consistent device use of CPAP for hypertension, 

cardiac disease, diabetes, and gastric reflux.





* Change auto CPAP pressure to 5-8.5  cmH2O


* Mask refitting


* Notify me if snoring with mask or feeling that the pressure is too much or too

  little


* Attempt to lose weight


* Call this office if any problems using CPAP


* Return for follow up in 1-2 months, or sooner if concerns arise





Counseling Topics: Spare mask, Weight loss health impact


Visit Type: In Office


Time Spent with Patient (minutes): 20


Provider Statement: I spent 100% of the Face to Face Visit with the patient with

greater than 50% spent counseling the patient and coordination of care.

## 2021-07-20 ENCOUNTER — HOSPITAL ENCOUNTER (OUTPATIENT)
Dept: HOSPITAL 76 - SC | Age: 76
Discharge: HOME | End: 2021-07-20
Attending: NURSE PRACTITIONER
Payer: MEDICARE

## 2021-07-20 DIAGNOSIS — E66.9: ICD-10-CM

## 2021-07-20 DIAGNOSIS — G47.33: Primary | ICD-10-CM

## 2021-07-20 PROCEDURE — 99212 OFFICE O/P EST SF 10 MIN: CPT

## 2021-07-20 PROCEDURE — 99213 OFFICE O/P EST LOW 20 MIN: CPT

## 2021-07-20 NOTE — SLEEP CARE CONSULTATION
Information from patient questionnaire entered by Tadeo Sommer.





I have reviewed and concur with the information entered by Tadeo Sommer. This 

document represents the service I personally performed and the decisions made by

me, Lupe Sanchez ARNP.





History of Present Illness


Service Date and Time: 07/20/2021    1240


Previous diagnosis: Moderate, Obstructive Sleep Apnea-Hypopnea Syndrome


AHI: 23.8 (in 2021)


Reason for follow up: other (6-week f/u - pressure change)


Equipment type: CPAP


Equipment obtained from: [id number] (getting supplies as needed)


Mask style: Nasal


Backup mask available: Yes (other mask)


Last cushion change: 2 weeks


Prior sleep studies: Yes


Year and Where: 2021 - [facility]


Type of Sleep Study: Home sleep study


HPI additional information: 





CRISTI FELIX was diagnosed to have moderate, AHI 23.8, obstructive sleep apnea-

hypopnea syndrome and returned today for CPAP therapy 6 week pressure change 

follow-up.





CPAP Compliance Data





- Data Reviewed with Patient


Average duration of nightly device use: 1 h 31 min


Compliance rate %: 2.4


Current pressure setting (cmH2O): 5-8.5


Humidity setting: 3


Heated hose setting: 3


Average residual AHI: 4.6


Average large leak: 15 min 58 sec





Subjective


Missed days of use due to: reports: mask issues, travel


Patient concerns: reports: mask discomfort, mask leak noise.  denies: 

aerophagia, air blowing in eyes, condensation in mask/hose, nasal congestion, 

dry mouth, nose, throat, epistaxis, other


Observed to snore while using device: No


Current pressure setting perceived as: comfortable


On therapy, patient: reports: other (not sure it is making a difference)


Initial [place] Sleepiness Scale score: 7 (in 2020)


Current [place] Sleepiness Scale score: 4





Allergies and Home Medications


Home medication list reviewed: Yes (no changes)





Review of Systems


Review of systems same as previous: Yes (no changes)





Physical Exam


Heart Rate: 67


O2 Saturation: 91


Height: 5 ft


Weight: 204 lb


Body Mass Index: 39.8


BMI Classification: Obese





Impression and Plan





1. Obstructive Sleep Apnea-Hypopnea Syndrome, moderate, with poor treatment 

compliance and good apnea control. Patient states she does not feel the CPAP 

machine is helping her sleep better or feel more rested. Patient has also been 

finding her mask off her face or on her forehead during the night. She states 

she is not eligible for more mask cushions until September so she has to wait 

for this before trying the full face mask I recommended at her last visit. I 

reviewed with patient that she is only 2.4% compliant and that she needs to be 

using the CPAP more, at least 4 hours a night to get the best benefit. 

Compliance guidelines reviewed for insurance coverage. Patient was counseled on 

the difference between meeting compliance and optimal use of CPAP. Optimal use 

of CPAP is use of CPAP with all sleep to obtain maximum benefit of treatment. 

Patient is encouraged to use CPAP with all sleep. She voiced understanding. 

Patient does have a Dreamstation that may be on the [employer] recall. 

Patient states that she has heard this but has not noticed any black particles 

in the tubing or device. She has not noticed any unusual issues with using the 

device. I advised patient that she may use an inline filter to try to reduce any

particulates being inhaled or ingested. She thinks she will continue to try to 

use her device. I advised her to register her device online or she may call. She

voiced understanding. Patient's apnea severity and rationale for treatment to 

reduce apnea, improve sleep quality and reduce cardiovascular and 

cerebrovascular events was reviewed. I also reviewed the benefit of consistent 

device use of CPAP for hypertension, cardiac disease, diabetes, and gastric 

reflux. I encouraged patient to try to lose weight. 





* Continue auto  CPAP pressure at 5-8.5 cmH2O


* Patient to register her device with [employer] for recall


* Notify me if snoring with mask or feeling that the pressure is too much or too

  little


* Attempt to lose weight


* Call this office if any problems using CPAP


* Return for follow up in 1-2 months, or sooner if concerns arise





Counseling Topics: Spare mask, Weight loss health impact


Visit Type: In Office


Time Spent with Patient (minutes): 27


Provider Statement: I spent 100% of the Face to Face Visit with the patient with

greater than 50% spent counseling the patient and coordination of care.

## 2021-08-12 ENCOUNTER — HOSPITAL ENCOUNTER (OUTPATIENT)
Dept: HOSPITAL 76 - LAB.WCP | Age: 76
Discharge: HOME | End: 2021-08-12
Attending: INTERNAL MEDICINE
Payer: MEDICARE

## 2021-08-12 DIAGNOSIS — E78.2: Primary | ICD-10-CM

## 2021-08-12 DIAGNOSIS — E11.8: ICD-10-CM

## 2021-08-12 DIAGNOSIS — I25.10: ICD-10-CM

## 2021-08-12 LAB
ANION GAP SERPL CALCULATED.4IONS-SCNC: 8 MMOL/L (ref 6–13)
BASOPHILS NFR BLD AUTO: 0 10^3/UL (ref 0–0.1)
BASOPHILS NFR BLD AUTO: 0.5 %
BUN SERPL-MCNC: 34 MG/DL (ref 6–20)
CALCIUM UR-MCNC: 10.3 MG/DL (ref 8.5–10.3)
CHLORIDE SERPL-SCNC: 104 MMOL/L (ref 101–111)
CHOLEST SERPL-MCNC: 143 MG/DL
CO2 SERPL-SCNC: 26 MMOL/L (ref 21–32)
CREAT SERPLBLD-SCNC: 1.1 MG/DL (ref 0.4–1)
EOSINOPHIL # BLD AUTO: 0.5 10^3/UL (ref 0–0.7)
EOSINOPHIL NFR BLD AUTO: 7.4 %
ERYTHROCYTE [DISTWIDTH] IN BLOOD BY AUTOMATED COUNT: 13.2 % (ref 12–15)
EST. AVERAGE GLUCOSE BLD GHB EST-MCNC: 214 MG/DL (ref 70–100)
GFRSERPLBLD MDRD-ARVRAT: 48 ML/MIN/{1.73_M2} (ref 89–?)
GLUCOSE SERPL-MCNC: 216 MG/DL (ref 70–100)
HBA1C MFR BLD HPLC: 9.1 % (ref 4.27–6.07)
HCT VFR BLD AUTO: 42.4 % (ref 37–47)
HDLC SERPL-MCNC: 43 MG/DL
HDLC SERPL: 3.3 {RATIO} (ref ?–4.4)
HGB UR QL STRIP: 13.9 G/DL (ref 12–16)
LDLC SERPL CALC-MCNC: 69 MG/DL
LDLC/HDLC SERPL: 1.6 {RATIO} (ref ?–4.4)
LYMPHOCYTES # SPEC AUTO: 2.3 10^3/UL (ref 1.5–3.5)
LYMPHOCYTES NFR BLD AUTO: 31.7 %
MCH RBC QN AUTO: 28.3 PG (ref 27–31)
MCHC RBC AUTO-ENTMCNC: 32.8 G/DL (ref 32–36)
MCV RBC AUTO: 86.2 FL (ref 81–99)
MONOCYTES # BLD AUTO: 0.6 10^3/UL (ref 0–1)
MONOCYTES NFR BLD AUTO: 7.7 %
NEUTROPHILS # BLD AUTO: 3.8 10^3/UL (ref 1.5–6.6)
NEUTROPHILS # SNV AUTO: 7.3 X10^3/UL (ref 4.8–10.8)
NEUTROPHILS NFR BLD AUTO: 52.3 %
NRBC # BLD AUTO: 0 /100WBC
NRBC # BLD AUTO: 0 X10^3/UL
PDW BLD AUTO: 10.2 FL (ref 7.9–10.8)
PLATELET # BLD: 281 10^3/UL (ref 130–450)
POTASSIUM SERPL-SCNC: 4.4 MMOL/L (ref 3.5–5)
RBC MAR: 4.92 10^6/UL (ref 4.2–5.4)
SODIUM SERPLBLD-SCNC: 138 MMOL/L (ref 135–145)
TRIGL P FAST SERPL-MCNC: 157 MG/DL
VLDLC SERPL-SCNC: 31 MG/DL

## 2021-08-12 PROCEDURE — 85025 COMPLETE CBC W/AUTO DIFF WBC: CPT

## 2021-08-12 PROCEDURE — 83036 HEMOGLOBIN GLYCOSYLATED A1C: CPT

## 2021-08-12 PROCEDURE — 80048 BASIC METABOLIC PNL TOTAL CA: CPT

## 2021-08-12 PROCEDURE — 83721 ASSAY OF BLOOD LIPOPROTEIN: CPT

## 2021-08-12 PROCEDURE — 36415 COLL VENOUS BLD VENIPUNCTURE: CPT

## 2021-08-12 PROCEDURE — 80061 LIPID PANEL: CPT

## 2021-09-01 ENCOUNTER — HOSPITAL ENCOUNTER (OUTPATIENT)
Dept: HOSPITAL 76 - SC | Age: 76
Discharge: HOME | End: 2021-09-01
Attending: NURSE PRACTITIONER
Payer: MEDICARE

## 2021-09-01 DIAGNOSIS — E66.9: ICD-10-CM

## 2021-09-01 DIAGNOSIS — G47.33: Primary | ICD-10-CM

## 2021-09-01 PROCEDURE — 99212 OFFICE O/P EST SF 10 MIN: CPT

## 2021-09-01 PROCEDURE — 99213 OFFICE O/P EST LOW 20 MIN: CPT

## 2021-09-01 NOTE — SLEEP CARE CONSULTATION
Information from patient questionnaire entered by Unique Tony.





I have reviewed and concur with the information entered by Unique Tony. 

This document represents the service I personally performed and the decisions 

made by me, Lupe Sanchez ARNP.





History of Present Illness


Service Date and Time: 09/01/2021    1246


Previous diagnosis: Moderate, Obstructive Sleep Apnea-Hypopnea Syndrome


AHI: 23.8 (in 2021)(21.9 in 2011)


Reason for follow up: other (6 week )


Equipment type: CPAP


Equipment obtained from: [employer] (getting supplies as needed)


Mask style: Nasal


Backup mask available: No (will need to keep old mask when replaced)


Last cushion change: 2 weeks ago


Prior sleep studies: Yes


Year and Where: 2021 - [facility]


Type of Sleep Study: Home sleep study


HPI additional information: 





CRISTI FELIX was diagnosed to have moderate, AHI 23.8, obstructive sleep apnea-

hypopnea syndrome and returned today for CPAP therapy 6 week follow-up.





CPAP Compliance Data





- Data Reviewed with Patient


Average duration of nightly device use: 1 hr 37 min


Compliance rate %: 3.3


Current pressure setting (cmH2O): 5-8.5


Humidity setting: 3


Heated hose setting: 3


Average residual AHI: 4.1


Average large leak: 12 min 46 sec





Subjective


Missed days of use due to: reports: mask issues (air blowing in her face is too 

cold), travel


Patient concerns: reports: mask discomfort, mask leak noise, other (cold air 

blowing up into eyes).  denies: aerophagia, air blowing in eyes, condensation in

mask/hose, nasal congestion, dry mouth, nose, throat, epistaxis


Observed to snore while using device: No


Current pressure setting perceived as: comfortable


On therapy, patient: reports: other (she is not noticing a difference but is not

using much).  denies: drowsiness while driving


Initial [place] Sleepiness Scale score: 7 (in 2020)


Current [place] Sleepiness Scale score: 7





Allergies and Home Medications


Home medication list reviewed: Yes (no changes)





Review of Systems


Review of systems same as previous: Yes (no changes)





Physical Exam


Heart Rate: 66


O2 Saturation: 96


Height: 5 ft


Weight: 203 lb


Body Mass Index: 39.6


BMI Classification: Obese





Impression and Plan





1. Obstructive Sleep Apnea-Hypopnea Syndrome, moderate, with poor treatment 

compliance and good apnea control. Patient has not been able to use her device 

enough to notice any changes in how she feels or if she is sleeping better.  She

states after couple of hours she just cannot tolerate the mask.  I discussed 

with her different options for treatment such as oral appliance and Inspire 

implantable device.  Patient decided she would like to continue to try to be 

successful with the CPAP machine. I advised the patient that when she feels that

she cannot wear the mask anymore that she remove it, relax until she feels more 

calm and then replace the mask. She can then try to go back to sleep with the 

mask on.  If she can increase her time from the 1 hour and 37 minutes to 4 hours

to increase compliance she should start feeling better. Patient was encouraged 

to lose weight for their overall health and to reduce apneas. Patient's apnea 

severity and rationale for treatment to reduce apnea, improve sleep quality and 

reduce cardiovascular and cerebrovascular events was reviewed. I also reviewed 

the benefit of consistent device use of CPAP for hypertension, cardiac disease, 

diabetes, and gastric reflux.





* Continue auto CPAP pressure at 5-8.5 cmH2O


* Notify me if snoring with mask or feeling that the pressure is too much or too

  little


* Attempt to lose weight


* Call this office if any problems using CPAP


* Return for follow up in 1-2 months, or sooner if concerns arise





Counseling Topics: Spare mask, Weight loss health impact


Visit Type: In Office


Time Spent with Patient (minutes): 25


Provider Statement: I spent 100% of the Face to Face Visit with the patient with

greater than 50% spent counseling the patient and coordination of care.

## 2021-10-01 ENCOUNTER — HOSPITAL ENCOUNTER (OUTPATIENT)
Dept: HOSPITAL 76 - SC | Age: 76
Discharge: HOME | End: 2021-10-01
Attending: NURSE PRACTITIONER
Payer: MEDICARE

## 2021-10-01 DIAGNOSIS — E66.01: ICD-10-CM

## 2021-10-01 DIAGNOSIS — G47.33: Primary | ICD-10-CM

## 2021-10-01 PROCEDURE — 99213 OFFICE O/P EST LOW 20 MIN: CPT

## 2021-10-01 PROCEDURE — 99212 OFFICE O/P EST SF 10 MIN: CPT

## 2021-10-01 NOTE — SLEEP CARE CONSULTATION
Information from patient questionnaire entered by Zulma Vinson.





I have reviewed and concur with the information entered by Zulma Vinson. This 

document represents the service I personally performed and the decisions made by

me, Lupe Sanchez ARNP.





History of Present Illness


Service Date and Time: 10/01/2021    1257


Previous diagnosis: Moderate, Obstructive Sleep Apnea-Hypopnea Syndrome


AHI: 23.8 (in )


Reason for follow up: one month


Equipment type: CPAP


Equipment obtained from: [employer] (getting supplies as needed)


Mask style: Nasal


Backup mask available: No (need to keep old mask when replaced)


Prior sleep studies: Yes


Year and Where:  - [facility]


Type of Sleep Study: Home sleep study


HPI additional information: 





CRISTI FELIX was diagnosed to have moderate, AHI 23.8, obstructive sleep apnea-

hypopnea syndrome and returned today for CPAP therapy one month follow-up.





Sleep Study





- Results


Type of Sleep Study: Home sleep study


Prior sleep studies: Yes


[facility] and [facility]:  [facility]





CPAP Compliance Data





- Data Reviewed with Patient


Average duration of nightly device use: 2 hours 7 minutes


Compliance rate %: 0


Current pressure setting (cmH2O): 5-8


Humidity setting: 3


Heated hose settin


Average residual AHI: 3.0


Average large leak: 33 minutes 30 seconds





Subjective


Missed days of use due to: reports: family emergency (), travel (at 

cousins to help out)


Patient concerns: reports: mask discomfort (better with barriers on face mask), 

condensation in mask/hose (2 times, ? filling too full), dry mouth, nose, throat

(chronic dry mouth).  denies: aerophagia, air blowing in eyes, mask leak noise, 

nasal congestion, epistaxis, other


Observed to snore while using device: No


Current pressure setting perceived as: comfortable


On therapy, patient: reports: other (has not noted difference but only averaging

2 hours when she uses it).  denies: drowsiness while driving


Initial [place] Sleepiness Scale score: 7 (in )


Current [place] Sleepiness Scale score: 5





Allergies and Home Medications


Home medication list reviewed: Yes (took off omeprazole)





Review of Systems


Review of systems same as previous: Yes (no changes)





Physical Exam


Heart Rate: 76


O2 Saturation: 97


Height: 5 ft


Weight: 206 lb


Body Mass Index: 40.2


BMI Classification: Morbidly Obese





Impression and Plan





1. Obstructive Sleep Apnea-Hypopnea Syndrome, moderate, with poor treatment 

compliance and good apnea control. On CPAP therapy, the patient has better sleep

quality and is more rested overall. Patient states she went to stay at a 

cousin's house to help her out for a while and she did not use her CPAP while 

she was there.  She then had a best friend die and went to the .  She 

states she just cannot get used to wearing it but she is not been trying to put 

it on every night.  She only lasts about 2 hours when she does use it.  I 

advised her to try and wear it in the afternoon a little bit for a few days to 

get used to the pressure and then put it on every night before she goes to bed. 

Patient voiced understanding and agreement to try harder for the next month or 

so to use it nightly. Patient's apnea severity and rationale for treatment to 

reduce apnea, improve sleep quality and reduce cardiovascular and c

erebrovascular events was reviewed. I also reviewed the benefit of consistent 

device use of CPAP for hypertension, cardiac disease, diabetes and gastric 

reflux.





* Continue auto  CPAP pressure at 5-8 cmH2O


* Notify me if snoring with mask or feeling that the pressure is too much or too

  little


* Attempt to lose weight


* Call this office if any problems using CPAP


* Return for follow up in 1 month, or sooner if concerns arise





Counseling Topics: Spare mask, Weight loss health impact


Visit Type: In Office


Time Spent with Patient (minutes): 20


Provider Statement: I spent 100% of the Face to Face Visit with the patient with

greater than 50% spent counseling the patient and coordination of care.

## 2021-11-05 ENCOUNTER — HOSPITAL ENCOUNTER (OUTPATIENT)
Dept: HOSPITAL 76 - SC | Age: 76
Discharge: HOME | End: 2021-11-05
Attending: NURSE PRACTITIONER
Payer: MEDICARE

## 2021-11-05 VITALS — DIASTOLIC BLOOD PRESSURE: 72 MMHG | SYSTOLIC BLOOD PRESSURE: 132 MMHG

## 2021-11-05 DIAGNOSIS — E66.01: ICD-10-CM

## 2021-11-05 DIAGNOSIS — G47.33: Primary | ICD-10-CM

## 2021-11-05 PROCEDURE — 99213 OFFICE O/P EST LOW 20 MIN: CPT

## 2021-11-05 PROCEDURE — 99212 OFFICE O/P EST SF 10 MIN: CPT

## 2021-11-05 NOTE — SLEEP CARE CONSULTATION
Information from patient questionnaire entered by Constance Rabago MA.





I have reviewed and concur with the information entered by Constance Rabago MA. 

This document represents the service I personally performed and the decisions 

made by me, Lupe Sanchez ARNP.





History of Present Illness


Service Date and Time: 2021    0952


Previous diagnosis: Moderate, Obstructive Sleep Apnea-Hypopnea Syndrome


AHI: 23.8 (in )(21.9 in )


Reason for follow up: one month


Equipment type: CPAP


Equipment obtained from: [email or web address] (getting supplies as needed)


Mask style: Nasal


Prior sleep studies: Yes


Year and Where:  - [facility]


Type of Sleep Study: Home sleep study


HPI additional information: 





CRISTI FELIX was diagnosed to have moderate, AHI 23.8, obstructive sleep apnea-

hypopnea syndrome and returned today for CPAP therapy one month follow-up.





Sleep Study





- Results


Type of Sleep Study: Home sleep study


Prior sleep studies: Yes


[facility] and Where:  [facility]





CPAP Compliance Data





- Data Reviewed with Patient


Average duration of nightly device use: 2 hours 24 minutes


Compliance rate %: 13.3


Current pressure setting (cmH2O): 5-8.5


Humidity setting: 3


Heated hose settin


Average residual AHI: 4.4


Central apnea: 0.4


Obstructive apnea: 1.9


Average large leak: 4 minutes 15 seconds





Subjective


Missed days of use due to: reports: mask issues


Patient concerns: reports: mask discomfort, mask leak noise, condensation in 

mask/hose (2-3 times overall), dry mouth, nose, throat, epistaxis (bloody nose; 

twice, not real bad).  denies: aerophagia, air blowing in eyes, nasal 

congestion, other


Observed to snore while using device: No


Current pressure setting perceived as: comfortable


On therapy, patient: reports: other (she doesn't notice a difference when using 

her CPAP)


Initial [place] Sleepiness Scale score: 7 (in )


Current [place] Sleepiness Scale score: 6 ()





Allergies and Home Medications


Home medication list reviewed: Yes (no changes)





Review of Systems


Review of systems same as previous: Yes (no changes)





Physical Exam


Vital signs obtained and entered by: RUDOLPH RODNEY


Blood Pressure: 132/72 (left)


Cuff size: wrist


Heart Rate: 67


O2 Saturation: 97 (with mask)


Height: 5 ft


Weight: 209 lb (without boots)


Body Mass Index: 40.8


BMI Classification: Morbidly Obese





Impression and Plan





1. Obstructive Sleep Apnea-Hypopnea Syndrome, moderate, with poor treatment 

compliance and good apnea control. Patient states she has not noticed a whole 

lot of difference when she uses her device or when she does not.  She is only at

13.3% compliant though, so she is not getting full benefit. She will find the 

mask in her hand when she wakes up.  She has mask leak noises waking her up.  

She also can wake up with some dry mouth and had 2 occasions where she had a 

little bit of bloody noses. Her humidity is set on 3 and I encouraged her to 

increase it to 4. Patient has also voiced that she is not sure she can get used 

to this air blowing in her face.  I reviewed other options such as oral 

appliance with positional therapy and inspire therapy which could control her 

sleep apnea. We also discussed the option of trying a different mask to see if 

she can find one that is more comfortable for her and will leave on her face at 

night.  Patient states she would like to try this before she tries anything 

else. Patient's apnea severity and rationale for treatment to reduce apnea, 

improve sleep quality and reduce cardiovascular and cerebrovascular events was 

reviewed. I also reviewed the benefit of consistent device use of CPAP for 

hypertension, cardiac disease, diabetes and gastric reflux. Patient gained 3 

pounds since her last visit. Obesity increases the risk of apnea, CPAP pressure 

requirements and overall health risks especially cardiovascular and diabetes. 

Thus patient is advised to lose weight. Patient was encouraged to lose weight 

for their overall health and to reduce apneas.





* Continue auto CPAP pressure at 5-8.5 cmH2O


* Mask refitting


* Notify me if snoring with mask or feeling that the pressure is too much or too

  little


* Attempt to lose weight


* Call this office if any problems using CPAP


* Return for follow up in 1-2 months, or sooner if concerns arise





Counseling Topics: Weight loss health impact


Time Spent with Patient (minutes): 22

## 2021-11-10 ENCOUNTER — HOSPITAL ENCOUNTER (OUTPATIENT)
Dept: HOSPITAL 76 - LAB.WCP | Age: 76
Discharge: HOME | End: 2021-11-10
Attending: FAMILY MEDICINE
Payer: MEDICARE

## 2021-11-10 DIAGNOSIS — E11.8: Primary | ICD-10-CM

## 2021-11-10 LAB
ALBUMIN DIAFP-MCNC: 4.1 G/DL (ref 3.2–5.5)
ALBUMIN/GLOB SERPL: 1.4 {RATIO} (ref 1–2.2)
ALP SERPL-CCNC: 82 IU/L (ref 42–121)
ALT SERPL W P-5'-P-CCNC: 21 IU/L (ref 10–60)
ANION GAP SERPL CALCULATED.4IONS-SCNC: 12 MMOL/L (ref 6–13)
AST SERPL W P-5'-P-CCNC: 21 IU/L (ref 10–42)
BASOPHILS NFR BLD AUTO: 0.1 10^3/UL (ref 0–0.1)
BASOPHILS NFR BLD AUTO: 0.6 %
BILIRUB BLD-MCNC: 0.8 MG/DL (ref 0.2–1)
BUN SERPL-MCNC: 26 MG/DL (ref 6–20)
CALCIUM UR-MCNC: 10.4 MG/DL (ref 8.5–10.3)
CHLORIDE SERPL-SCNC: 103 MMOL/L (ref 101–111)
CHOLEST SERPL-MCNC: 127 MG/DL
CO2 SERPL-SCNC: 26 MMOL/L (ref 21–32)
CREAT SERPLBLD-SCNC: 1 MG/DL (ref 0.4–1)
EOSINOPHIL # BLD AUTO: 0.4 10^3/UL (ref 0–0.7)
EOSINOPHIL NFR BLD AUTO: 5.2 %
ERYTHROCYTE [DISTWIDTH] IN BLOOD BY AUTOMATED COUNT: 12.9 % (ref 12–15)
EST. AVERAGE GLUCOSE BLD GHB EST-MCNC: 212 MG/DL (ref 70–100)
GFRSERPLBLD MDRD-ARVRAT: 54 ML/MIN/{1.73_M2} (ref 89–?)
GLOBULIN SER-MCNC: 3 G/DL (ref 2.1–4.2)
GLUCOSE SERPL-MCNC: 195 MG/DL (ref 70–100)
HBA1C MFR BLD HPLC: 9 % (ref 4.27–6.07)
HCT VFR BLD AUTO: 43.2 % (ref 37–47)
HDLC SERPL-MCNC: 49 MG/DL
HDLC SERPL: 2.6 {RATIO} (ref ?–4.4)
HGB UR QL STRIP: 14.2 G/DL (ref 12–16)
LDLC SERPL CALC-MCNC: 54 MG/DL
LDLC/HDLC SERPL: 1.1 {RATIO} (ref ?–4.4)
LYMPHOCYTES # SPEC AUTO: 2.2 10^3/UL (ref 1.5–3.5)
LYMPHOCYTES NFR BLD AUTO: 27.5 %
MCH RBC QN AUTO: 28.7 PG (ref 27–31)
MCHC RBC AUTO-ENTMCNC: 32.9 G/DL (ref 32–36)
MCV RBC AUTO: 87.3 FL (ref 81–99)
MONOCYTES # BLD AUTO: 0.5 10^3/UL (ref 0–1)
MONOCYTES NFR BLD AUTO: 6.8 %
NEUTROPHILS # BLD AUTO: 4.7 10^3/UL (ref 1.5–6.6)
NEUTROPHILS # SNV AUTO: 7.9 X10^3/UL (ref 4.8–10.8)
NEUTROPHILS NFR BLD AUTO: 59.6 %
NRBC # BLD AUTO: 0 /100WBC
NRBC # BLD AUTO: 0 X10^3/UL
PDW BLD AUTO: 10.3 FL (ref 7.9–10.8)
PLATELET # BLD: 267 10^3/UL (ref 130–450)
POTASSIUM SERPL-SCNC: 4.5 MMOL/L (ref 3.5–5)
PROT SPEC-MCNC: 7.1 G/DL (ref 6.7–8.2)
RBC MAR: 4.95 10^6/UL (ref 4.2–5.4)
SODIUM SERPLBLD-SCNC: 141 MMOL/L (ref 135–145)
TRIGL P FAST SERPL-MCNC: 120 MG/DL
VLDLC SERPL-SCNC: 24 MG/DL

## 2021-11-10 PROCEDURE — 36415 COLL VENOUS BLD VENIPUNCTURE: CPT

## 2021-11-10 PROCEDURE — 85025 COMPLETE CBC W/AUTO DIFF WBC: CPT

## 2021-11-10 PROCEDURE — 83036 HEMOGLOBIN GLYCOSYLATED A1C: CPT

## 2021-11-10 PROCEDURE — 83721 ASSAY OF BLOOD LIPOPROTEIN: CPT

## 2021-11-10 PROCEDURE — 80053 COMPREHEN METABOLIC PANEL: CPT

## 2021-11-10 PROCEDURE — 80061 LIPID PANEL: CPT

## 2021-12-09 ENCOUNTER — HOSPITAL ENCOUNTER (OUTPATIENT)
Dept: HOSPITAL 76 - SC | Age: 76
Discharge: HOME | End: 2021-12-09
Attending: NURSE PRACTITIONER
Payer: MEDICARE

## 2021-12-09 VITALS — DIASTOLIC BLOOD PRESSURE: 77 MMHG | SYSTOLIC BLOOD PRESSURE: 149 MMHG

## 2021-12-09 DIAGNOSIS — E66.9: ICD-10-CM

## 2021-12-09 DIAGNOSIS — G47.33: Primary | ICD-10-CM

## 2021-12-09 PROCEDURE — 99213 OFFICE O/P EST LOW 20 MIN: CPT

## 2021-12-09 NOTE — SLEEP CARE CONSULTATION
Information from patient questionnaire entered by Constance Rabago MA.





I have reviewed and concur with the information entered by Constance Rabago MA. 

This document represents the service I personally performed and the decisions 

made by me, Lupe Sanchez ARNP.





History of Present Illness


Service Date and Time: 2021    1019


Previous diagnosis: Moderate, Obstructive Sleep Apnea-Hypopnea Syndrome


AHI: 23.8 (in )


Reason for follow up: one month


Equipment type: CPAP


Equipment obtained from: [employer] (getting supplies as needed)


Mask style: Nasal


Backup mask available: Yes (other mask)


Last cushion change: 3 weeks


Prior sleep studies: Yes


Year and Where:  - [facility]


Type of Sleep Study: Home sleep study


HPI additional information: 





CRISTI FELIX was diagnosed to have moderate, AHI 23.8, obstructive sleep apnea-

hypopnea syndrome and returned today for CPAP therapy one month follow-up.





Sleep Study





- Results


Type of Sleep Study: Home sleep study


Prior sleep studies: Yes


Year and Where:  - [facility]





CPAP Compliance Data





- Data Reviewed with Patient


Average duration of nightly device use: 1 HOURS 20 MINUTES


Compliance rate %: 0


Current pressure setting (cmH2O): 5-8.5


Humidity setting: 3


Heated hose settin


Average residual AHI: 8.1


Average large leak: 4 MINUTES 20 SECONDS





Subjective


Missed days of use due to: reports: mask issues (she will wake up with mask 

off), travel


Patient concerns: reports: mask discomfort (better with new mask), dry mouth, 

nose, throat, epistaxis.  denies: aerophagia, air blowing in eyes, mask leak 

noise, condensation in mask/hose, nasal congestion, other


Observed to snore while using device: No


Current pressure setting perceived as: too high


On therapy, patient: reports: other (not using CPAP enough for benefit)


Initial [place] Sleepiness Scale score: 7 (in )


Current [place] Sleepiness Scale score: 5 (in )





Allergies and Home Medications


Home medication list reviewed: Yes (no changes)





Review of Systems


Review of systems same as previous: Yes (no changes)





Physical Exam


Vital signs obtained and entered by: ANDREW SHARMA


Blood Pressure: 149/77 (right)


Cuff size: wrist


Heart Rate: 76


O2 Saturation: 98 (with mask)


Height: 5 ft


Weight: 196 lb (with coat and clothes)


Weight change since last visit: 14 lb loss


Body Mass Index: 38.2


BMI Classification: Obese





Impression and Plan





1. Obstructive Sleep Apnea-Hypopnea Syndrome, moderate, with poor treatment 

compliance and fair apnea control. Patient has not been able to be compliant 

with her CPAP machine and just wants to discontinue its use.  We discussed her 

other options and she would like to try positional therapy. When patient slept 

non-supine at her last sleep study her AHI was 10.02.  If patient sleeps on her 

side with her head elevated she may be able to reduce her AHI even more, 

although it might be suboptimal apnea control. Patient has a bed that can 

elevate her head. Positional therapy is not ideal but nonsupine sleep with head 

elevation may control her apneas enough to be of benefit. Patient did not want 

to try an oral appliance or other therapies that we have reviewed in the past. I

will discontinue her CPAP therapy and she will try the positional therapy with 

her head elevated.  I will follow-up with her in few months to see how she is 

doing with the positional therapy. Patient has lost about 14 pounds since her 

last visit.  I also discussed weight loss as a way to reduce her apneas.  She is

watching what she eats and having some good success at this time.  I encouraged 

her to continue to try to lose weight as this will help to reduce the apneas and

improve her overall health. Patient voiced understanding and agreement with this

plan of care. Patient's apnea severity and rationale for treatment to reduce 

apnea, improve sleep quality and reduce cardiovascular and cerebrovascular 

events was reviewed. I also reviewed the benefit of consistent device use of 

CPAP for hypertension, cardiac disease, diabetes and gastric reflux.





* Discontinue CPAP therapy


* Positional therapy with head elevated to control apneas


* Continue to try to lose weight


* Call this office if any problems


* Return for follow up in 3 months, or sooner if concerns arise





Counseling Topics: Sleeping position, Weight loss health impact


Visit Type: In Office


Time Spent with Patient (minutes): 24


Provider Statement: I spent 100% of the Face to Face Visit with the patient with

greater than 50% spent counseling the patient and coordination of care.

## 2021-12-12 ENCOUNTER — HOSPITAL ENCOUNTER (EMERGENCY)
Dept: HOSPITAL 76 - ED | Age: 76
Discharge: HOME | End: 2021-12-12
Payer: MEDICARE

## 2021-12-12 VITALS — DIASTOLIC BLOOD PRESSURE: 77 MMHG | SYSTOLIC BLOOD PRESSURE: 136 MMHG

## 2021-12-12 DIAGNOSIS — R07.89: Primary | ICD-10-CM

## 2021-12-12 LAB
ALBUMIN DIAFP-MCNC: 3.8 G/DL (ref 3.2–5.5)
ALBUMIN/GLOB SERPL: 1.1 {RATIO} (ref 1–2.2)
ALP SERPL-CCNC: 74 IU/L (ref 42–121)
ALT SERPL W P-5'-P-CCNC: 14 IU/L (ref 10–60)
ANION GAP SERPL CALCULATED.4IONS-SCNC: 11 MMOL/L (ref 6–13)
AST SERPL W P-5'-P-CCNC: 15 IU/L (ref 10–42)
BASOPHILS NFR BLD AUTO: 0 10^3/UL (ref 0–0.1)
BASOPHILS NFR BLD AUTO: 0.5 %
BILIRUB BLD-MCNC: 0.9 MG/DL (ref 0.2–1)
BUN SERPL-MCNC: 20 MG/DL (ref 6–20)
CALCIUM UR-MCNC: 10.2 MG/DL (ref 8.5–10.3)
CHLORIDE SERPL-SCNC: 104 MMOL/L (ref 101–111)
CO2 SERPL-SCNC: 22 MMOL/L (ref 21–32)
CREAT SERPLBLD-SCNC: 1 MG/DL (ref 0.4–1)
EOSINOPHIL # BLD AUTO: 0.4 10^3/UL (ref 0–0.7)
EOSINOPHIL NFR BLD AUTO: 4.3 %
ERYTHROCYTE [DISTWIDTH] IN BLOOD BY AUTOMATED COUNT: 12.7 % (ref 12–15)
GFRSERPLBLD MDRD-ARVRAT: 54 ML/MIN/{1.73_M2} (ref 89–?)
GLOBULIN SER-MCNC: 3.5 G/DL (ref 2.1–4.2)
GLUCOSE SERPL-MCNC: 235 MG/DL (ref 70–100)
HCT VFR BLD AUTO: 40.3 % (ref 37–47)
HGB UR QL STRIP: 13.5 G/DL (ref 12–16)
LIPASE SERPL-CCNC: 19 U/L (ref 22–51)
LYMPHOCYTES # SPEC AUTO: 2.6 10^3/UL (ref 1.5–3.5)
LYMPHOCYTES NFR BLD AUTO: 30.8 %
MAGNESIUM SERPL-MCNC: 1.7 MG/DL (ref 1.7–2.8)
MCH RBC QN AUTO: 28.4 PG (ref 27–31)
MCHC RBC AUTO-ENTMCNC: 33.5 G/DL (ref 32–36)
MCV RBC AUTO: 84.7 FL (ref 81–99)
MONOCYTES # BLD AUTO: 0.7 10^3/UL (ref 0–1)
MONOCYTES NFR BLD AUTO: 8.6 %
NEUTROPHILS # BLD AUTO: 4.6 10^3/UL (ref 1.5–6.6)
NEUTROPHILS # SNV AUTO: 8.3 X10^3/UL (ref 4.8–10.8)
NEUTROPHILS NFR BLD AUTO: 55.7 %
NRBC # BLD AUTO: 0 /100WBC
NRBC # BLD AUTO: 0 X10^3/UL
PDW BLD AUTO: 10.3 FL (ref 7.9–10.8)
PLATELET # BLD: 254 10^3/UL (ref 130–450)
POTASSIUM SERPL-SCNC: 4 MMOL/L (ref 3.5–5)
PROT SPEC-MCNC: 7.3 G/DL (ref 6.7–8.2)
RBC MAR: 4.76 10^6/UL (ref 4.2–5.4)
SODIUM SERPLBLD-SCNC: 137 MMOL/L (ref 135–145)

## 2021-12-12 PROCEDURE — 84484 ASSAY OF TROPONIN QUANT: CPT

## 2021-12-12 PROCEDURE — 99284 EMERGENCY DEPT VISIT MOD MDM: CPT

## 2021-12-12 PROCEDURE — 36415 COLL VENOUS BLD VENIPUNCTURE: CPT

## 2021-12-12 PROCEDURE — 96375 TX/PRO/DX INJ NEW DRUG ADDON: CPT

## 2021-12-12 PROCEDURE — 83880 ASSAY OF NATRIURETIC PEPTIDE: CPT

## 2021-12-12 PROCEDURE — 83690 ASSAY OF LIPASE: CPT

## 2021-12-12 PROCEDURE — 93005 ELECTROCARDIOGRAM TRACING: CPT

## 2021-12-12 PROCEDURE — 83735 ASSAY OF MAGNESIUM: CPT

## 2021-12-12 PROCEDURE — 96374 THER/PROPH/DIAG INJ IV PUSH: CPT

## 2021-12-12 PROCEDURE — 80053 COMPREHEN METABOLIC PANEL: CPT

## 2021-12-12 PROCEDURE — 85025 COMPLETE CBC W/AUTO DIFF WBC: CPT

## 2021-12-12 NOTE — ED PHYSICIAN DOCUMENTATION
PD HPI CHEST PAIN





- Stated complaint


Stated Complaint: CP





- Chief complaint


Chief Complaint: Cardiac





- History obtained from


History obtained from: Patient





- History of Present Illness


Timing - onset: How many days ago (3)


Timing - onset during: Rest, Light activity


Timing - duration: Minutes


Timing - details: Gradual onset, Waxing and waning (noted pain with movement and

light activity. Would be gone at times. Noted it abruptly worse today about 4:30

AM and has persisted. Pain left chest to back, worse with movement and br

eathing.)


Quality: Aching, Sharp, Pain


Location: Left chest, Left shoulder/arm


Radiation: Back


Improved by: Rest


Worsened by: Inspiration, Movement.  No: Eating, Palpation


Associated symptoms: No: Shortness of air, Nausea, Vomiting, Feeling faint / d

abhijit, Palpitations


Similar symptoms before: Has not had sx before (prior CAD with stents/MI, but 

states this pain feels different.)


Recently seen: Not recently seen





Review of Systems


Constitutional: denies: Fever, Chills


Nose: denies: Rhinorrhea / runny nose, Congestion


Throat: denies: Sore throat


Cardiac: reports: Chest pain / pressure, Pedal edema (mild chronic).  denies: 

Palpitations, Calf pain


Respiratory: denies: Cough


GI: denies: Abdominal Pain, Nausea, Vomiting, Diarrhea


Skin: denies: Rash, Lesions


Neurologic: reports: Generalized weakness.  denies: Focal weakness, Numbness, 

Near syncope





PD PAST MEDICAL HISTORY





- Past Medical History


Cardiovascular: Hypertension, High cholesterol, Peripheral Vascular Disease, MI


Respiratory: Shortness of breath


Neuro: None


Endocrine/Autoimmune: Type 2 diabetes


GI: Colon polyps


: Incontinence


HEENT: Chronic vision loss, Other


Psych: Depression


Musculoskeletal: Osteoarthritis, Gout, Chronic back pain


Derm: Other





- Past Surgical History


Past Surgical History: Yes


General: Cholecystectomy, Appendectomy, Other


Cardiovascular: Coronary stent





- Present Medications


Home Medications: 


                                Ambulatory Orders











 Medication  Instructions  Recorded  Confirmed


 


Insulin Aspart (Vial) [NovoLOG] 15 - 40 units SUBQ TIDWM 03/14/14 12/18/20


 


allopurinoL [Allopurinol] 100 mg PO DAILY 12/31/15 12/18/20


 


Insulin Glargine [Lantus Solostar] 30 units SQ BID 05/31/16 12/18/20


 


Tolterodine Tartrate [Tolterodine 4 mg PO DAILY PM 07/15/19 12/18/20





Tartrate ER]   


 


Ascorbic Acid [Vitamin C] 500 mg PO DAILY 05/22/20 12/18/20


 


Dulaglutide [Trulicity] 1.5 mg SQ OAW 05/22/20 12/18/20


 


Amlodipine Besylate 5 mg PO DAILY 10/29/20 12/18/20


 


Aspirin Chewable [St Jayson 81 mg PO DAILY PM 10/29/20 12/18/20





Aspirin]   


 


Clopidogrel [Plavix] 75 mg PO DAILY 10/29/20 12/18/20


 


Cyanocobalamin (Vitamin B-12) 1,000 mcg PO DAILY 10/29/20 12/18/20





[Vitamin B-12]   


 


Gabapentin [Neurontin] 300 mg PO DAILY PM 10/29/20 12/18/20


 


Rosuvastatin Calcium 40 mg PO DAILY PM 10/29/20 12/18/20


 


Multivitamin 1 tab PO DAILY 12/18/20 12/18/20


 


Nitroglycerin [Nitrostat] 1 tab PO PRN PRN 12/18/20 12/18/20


 


Spironolactone [Aldactone] 1 tab PO DAILY 12/18/20 12/18/20














- Allergies


Allergies/Adverse Reactions: 


                                    Allergies











Allergy/AdvReac Type Severity Reaction Status Date / Time


 


succinylcholine Allergy Unknown Respiratory Verified 12/12/21 08:48


 


losartan [From Cozaar] Allergy  Unknown Verified 12/12/21 08:48


 


tramadol Allergy  Unknown Verified 12/12/21 08:48


 


bacitracin AdvReac Unknown Rash Verified 12/12/21 08:48





[From Neosporin     





([id number])]     


 


bacitracin zinc * AdvReac Unknown Rash Verified 12/12/21 08:48





[From Neosporin     





([id number])]     


 


diphenhydramine HCl * AdvReac Unknown Edema Verified 12/12/21 08:48





[From Benadryl]     


 


neomycin sulfate * AdvReac Unknown Rash Verified 12/12/21 08:48





[From Neosporin     





([id number])]     


 


polymyxin B AdvReac Unknown Rash Verified 12/12/21 08:48





[From Neosporin     





([id number])]     


 


Sulfa (Sulfonamide AdvReac Unknown Rash Verified 12/12/21 08:48





Antibiotics)     














- Social History


Does the pt smoke?: No


Smoking Status: Never smoker


Does the pt drink ETOH?: No


Does the pt have substance abuse?: No





- Immunizations


Immunizations are current?: Yes





- POLST


Patient has POLST: No





PD ED PE NORMAL





- Vitals


Vital signs reviewed: Yes





- General


General: Alert and oriented X 3, Well developed/nourished





- HEENT


HEENT: Pharynx benign





- Neck


Neck: Supple, no meningeal sign, No adenopathy





- Cardiac


Cardiac: RRR, No murmur





- Respiratory


Respiratory: Clear bilaterally, Other (tenderness left lateral chest wall mid 

axillary line. No rash nor sores. )





- Abdomen


Abdomen: Soft, Non tender





- Back


Back: No CVA TTP, No spinal TTP





- Derm


Derm: Normal color, Warm and dry, No rash





- Extremities


Extremities: Normal ROM s pain, No calf tenderness / cord, Other (1+ edema both 

ankles and lower lower legs. )





- Neuro


Neuro: Alert and oriented X 3, No motor deficit, Normal speech





Results





- Vitals


Vitals: 


                               Vital Signs - 24 hr











  12/12/21 12/12/21 12/12/21





  08:42 08:56 09:30


 


Temperature 36.8 C  


 


Heart Rate 66 71 64


 


Respiratory 21 16 15





Rate   


 


Blood Pressure 154/88 H 154/85 H 142/87 H


 


O2 Saturation 98 99 95














  12/12/21 12/12/21 12/12/21





  09:45 10:00 10:34


 


Temperature   


 


Heart Rate 63 63 64


 


Respiratory 21 19 15





Rate   


 


Blood Pressure 142/87 H 143/86 H 138/61 H


 


O2 Saturation 97 95 94














  12/12/21 12/12/21 12/12/21





  10:45 11:05 11:25


 


Temperature   


 


Heart Rate 64 64 68


 


Respiratory 16 17 12





Rate   


 


Blood Pressure 138/61 H 133/74 H 133/74 H


 


O2 Saturation 94 95 95














  12/12/21 12/12/21





  11:30 12:11


 


Temperature  


 


Heart Rate 70 61


 


Respiratory 14 21





Rate  


 


Blood Pressure 136/70 H 136/77 H


 


O2 Saturation 94 98








                                     Oxygen











O2 Source                      Room air

















- EKG (time done)


  ** 08:37


Rate: Rate (enter#) (61)


Rhythm: NSR


Axis: Normal


Intervals: Normal NM, RBBB


QRS: Normal.  No: Poor R wave progression (but slightly abnormal most likely c/w

transposed leads V1 and V2.)


Ischemia: Normal ST segments.  No: ST elevation c/w ischemia, ST depression


Compare to prior EKG: Unchanged from prior EKG (10/29/20)





- Labs


Labs: 


                                Laboratory Tests











  12/12/21 12/12/21 12/12/21





  08:43 08:43 08:43


 


WBC  8.3  


 


RBC  4.76  


 


Hgb  13.5  


 


Hct  40.3  


 


MCV  84.7  


 


MCH  28.4  


 


MCHC  33.5  


 


RDW  12.7  


 


Plt Count  254  


 


MPV  10.3  


 


Neut # (Auto)  4.6  


 


Lymph # (Auto)  2.6  


 


Mono # (Auto)  0.7  


 


Eos # (Auto)  0.4  


 


Baso # (Auto)  0.0  


 


Absolute Nucleated RBC  0.00  


 


Nucleated RBC %  0.0  


 


Sodium   137 


 


Potassium   4.0 


 


Chloride   104 


 


Carbon Dioxide   22 


 


Anion Gap   11.0 


 


BUN   20 


 


Creatinine   1.0 


 


Estimated GFR (MDRD)   54 L 


 


Glucose   235 H 


 


Calcium   10.2 


 


Magnesium   1.7 


 


Total Bilirubin   0.9 


 


AST   15 


 


ALT   14 


 


Alkaline Phosphatase   74 


 


Troponin I High Sens    9.9


 


B-Natriuretic Peptide   


 


Total Protein   7.3 


 


Albumin   3.8 


 


Globulin   3.5 


 


Albumin/Globulin Ratio   1.1 


 


Lipase   19 L 














  12/12/21 12/12/21





  08:43 10:55


 


WBC  


 


RBC  


 


Hgb  


 


Hct  


 


MCV  


 


MCH  


 


MCHC  


 


RDW  


 


Plt Count  


 


MPV  


 


Neut # (Auto)  


 


Lymph # (Auto)  


 


Mono # (Auto)  


 


Eos # (Auto)  


 


Baso # (Auto)  


 


Absolute Nucleated RBC  


 


Nucleated RBC %  


 


Sodium  


 


Potassium  


 


Chloride  


 


Carbon Dioxide  


 


Anion Gap  


 


BUN  


 


Creatinine  


 


Estimated GFR (MDRD)  


 


Glucose  


 


Calcium  


 


Magnesium  


 


Total Bilirubin  


 


AST  


 


ALT  


 


Alkaline Phosphatase  


 


Troponin I High Sens   8.6


 


B-Natriuretic Peptide  80 


 


Total Protein  


 


Albumin  


 


Globulin  


 


Albumin/Globulin Ratio  


 


Lipase  














- Rads (name of study)


  ** chest xray


Radiology: Prelim report reviewed (no acute process), See rad report





PD MEDICAL DECISION MAKING





- ED course


Complexity details: reviewed results (normal trop and repeat after 2 hours. 

Normal xray. ECG unchanged from Oct 2020. ), re-evaluated patient (chest pain 

improved with meds here. Still some with movement of shoulder. ), considered 

differential, d/w patient





Departure





- Departure


Disposition: 01 Home, Self Care


Clinical Impression: 


Chest pain


Qualifiers:


 Chest pain type: other chest pain Qualified Code(s): R07.89 - Other chest pain





Condition: Stable


Record reviewed to determine appropriate education?: Yes


Instructions:  ED Strain Chest Wall


Follow-Up: 


Heaven Alonzo DO [Primary Care Provider] - 


Comments: 


With the testing done today, we can exclude serious causes such as heart attack,

pneumonia, collapsed lung, heart failure.  Given the location and character of 

the pain, it does sound like muscular type chest wall pain.





Continue usual medications.  Add Tylenol every 4-6 hours if needed for pain over

the next several days.





I would anticipate improvement over the next several days.  Recheck if 

persistent or if you have increasing pain or other symptoms develop such as 

rash, fever, sores, cough, other concerns.


Discharge Date/Time: 12/12/21 12:12

## 2021-12-12 NOTE — XRAY REPORT
PROCEDURE:  Chest 1 View X-Ray

 

INDICATIONS:  Chest Pain

 

TECHNIQUE:  One view of the chest was acquired.  

 

COMPARISON:  10/29/2020

 

FINDINGS:  

 

Surgical changes and devices:  None.  

 

Lungs and pleura:  No pleural effusions or pneumothorax.  Lungs are clear.  

 

Mediastinum:  Mediastinal contours appear normal.  Heart size is normal.  

 

Bones and chest wall:  No suspicious bony lesions.  Overlying soft tissues appear unremarkable.  

 

IMPRESSION:  

No acute process.

 

Reviewed by: Anita Bhatia MD on 12/12/2021 9:15 AM [facility]

Approved by: Anita Bhatia MD on 12/12/2021 9:15 AM [facility]

 

 

Station ID:  [id number]

## 2021-12-14 ENCOUNTER — HOSPITAL ENCOUNTER (EMERGENCY)
Dept: HOSPITAL 76 - ED | Age: 76
Discharge: HOME | End: 2021-12-14
Payer: MEDICARE

## 2021-12-14 ENCOUNTER — HOSPITAL ENCOUNTER (OUTPATIENT)
Dept: HOSPITAL 76 - EMS | Age: 76
Discharge: ANOTHER HOSPITAL | End: 2021-12-14
Payer: MEDICARE

## 2021-12-14 VITALS — SYSTOLIC BLOOD PRESSURE: 166 MMHG | DIASTOLIC BLOOD PRESSURE: 77 MMHG

## 2021-12-14 DIAGNOSIS — I25.10: ICD-10-CM

## 2021-12-14 DIAGNOSIS — Z79.82: ICD-10-CM

## 2021-12-14 DIAGNOSIS — I45.10: ICD-10-CM

## 2021-12-14 DIAGNOSIS — Z79.02: ICD-10-CM

## 2021-12-14 DIAGNOSIS — R07.89: Primary | ICD-10-CM

## 2021-12-14 DIAGNOSIS — I10: ICD-10-CM

## 2021-12-14 DIAGNOSIS — R07.9: Primary | ICD-10-CM

## 2021-12-14 DIAGNOSIS — E11.9: ICD-10-CM

## 2021-12-14 DIAGNOSIS — Z79.4: ICD-10-CM

## 2021-12-14 DIAGNOSIS — Z95.5: ICD-10-CM

## 2021-12-14 LAB
ALBUMIN DIAFP-MCNC: 3.6 G/DL (ref 3.2–5.5)
ALBUMIN/GLOB SERPL: 1.2 {RATIO} (ref 1–2.2)
ALP SERPL-CCNC: 65 IU/L (ref 42–121)
ALT SERPL W P-5'-P-CCNC: 15 IU/L (ref 10–60)
ANION GAP SERPL CALCULATED.4IONS-SCNC: 11 MMOL/L (ref 6–13)
AST SERPL W P-5'-P-CCNC: 14 IU/L (ref 10–42)
BASOPHILS NFR BLD AUTO: 0 10^3/UL (ref 0–0.1)
BASOPHILS NFR BLD AUTO: 0.4 %
BILIRUB BLD-MCNC: 0.7 MG/DL (ref 0.2–1)
BUN SERPL-MCNC: 25 MG/DL (ref 6–20)
CALCIUM UR-MCNC: 9.8 MG/DL (ref 8.5–10.3)
CHLORIDE SERPL-SCNC: 103 MMOL/L (ref 101–111)
CO2 SERPL-SCNC: 19 MMOL/L (ref 21–32)
CREAT SERPLBLD-SCNC: 1 MG/DL (ref 0.4–1)
EOSINOPHIL # BLD AUTO: 0.3 10^3/UL (ref 0–0.7)
EOSINOPHIL NFR BLD AUTO: 2.9 %
ERYTHROCYTE [DISTWIDTH] IN BLOOD BY AUTOMATED COUNT: 12.6 % (ref 12–15)
GFRSERPLBLD MDRD-ARVRAT: 54 ML/MIN/{1.73_M2} (ref 89–?)
GLOBULIN SER-MCNC: 3 G/DL (ref 2.1–4.2)
GLUCOSE SERPL-MCNC: 196 MG/DL (ref 70–100)
HCT VFR BLD AUTO: 39.4 % (ref 37–47)
HGB UR QL STRIP: 13.3 G/DL (ref 12–16)
LIPASE SERPL-CCNC: 18 U/L (ref 22–51)
LYMPHOCYTES # SPEC AUTO: 2.6 10^3/UL (ref 1.5–3.5)
LYMPHOCYTES NFR BLD AUTO: 26.8 %
MCH RBC QN AUTO: 28.2 PG (ref 27–31)
MCHC RBC AUTO-ENTMCNC: 33.8 G/DL (ref 32–36)
MCV RBC AUTO: 83.5 FL (ref 81–99)
MONOCYTES # BLD AUTO: 0.8 10^3/UL (ref 0–1)
MONOCYTES NFR BLD AUTO: 8.3 %
NEUTROPHILS # BLD AUTO: 6 10^3/UL (ref 1.5–6.6)
NEUTROPHILS # SNV AUTO: 9.9 X10^3/UL (ref 4.8–10.8)
NEUTROPHILS NFR BLD AUTO: 61.3 %
NRBC # BLD AUTO: 0 /100WBC
NRBC # BLD AUTO: 0 X10^3/UL
PDW BLD AUTO: 9.6 FL (ref 7.9–10.8)
PLATELET # BLD: 259 10^3/UL (ref 130–450)
POTASSIUM SERPL-SCNC: 3.7 MMOL/L (ref 3.5–5)
PROT SPEC-MCNC: 6.6 G/DL (ref 6.7–8.2)
RBC MAR: 4.72 10^6/UL (ref 4.2–5.4)
SODIUM SERPLBLD-SCNC: 133 MMOL/L (ref 135–145)

## 2021-12-14 PROCEDURE — 96374 THER/PROPH/DIAG INJ IV PUSH: CPT

## 2021-12-14 PROCEDURE — 80053 COMPREHEN METABOLIC PANEL: CPT

## 2021-12-14 PROCEDURE — 93005 ELECTROCARDIOGRAM TRACING: CPT

## 2021-12-14 PROCEDURE — 99282 EMERGENCY DEPT VISIT SF MDM: CPT

## 2021-12-14 PROCEDURE — 84484 ASSAY OF TROPONIN QUANT: CPT

## 2021-12-14 PROCEDURE — 36415 COLL VENOUS BLD VENIPUNCTURE: CPT

## 2021-12-14 PROCEDURE — 83690 ASSAY OF LIPASE: CPT

## 2021-12-14 PROCEDURE — 85025 COMPLETE CBC W/AUTO DIFF WBC: CPT

## 2021-12-14 PROCEDURE — 96375 TX/PRO/DX INJ NEW DRUG ADDON: CPT

## 2021-12-14 NOTE — XRAY REPORT
PROCEDURE:  Chest 1 View X-Ray

 

INDICATIONS:  Chest pain

 

TECHNIQUE:  One view of the chest was acquired.  

 

COMPARISON:  Chest radiographs 12/12/2021

 

FINDINGS:  

 

Surgical changes and devices:  None.  

 

Lungs and pleura:  No pleural effusions or pneumothorax.  Lungs are clear.  

 

Mediastinum:  Mediastinal contours appear normal.  Heart size is normal. Mild aortic atherosclerotic 
calcifications.

 

Bones and chest wall:  No suspicious bony lesions.  Overlying soft tissues appear unremarkable.  

 

IMPRESSION:  

No acute cardiopulmonary abnormality.

 

There is no significant discrepancy when compared with the overnight teleradiology report.

 

Reviewed by: Hector Arellano MD on 12/14/2021 7:58 AM PST

Approved by: Hector Arellano MD on 12/14/2021 7:58 AM [facility]

 

 

Station ID:  [id number]

## 2021-12-14 NOTE — ED PHYSICIAN DOCUMENTATION
<ViktoriaFabian A - Last Filed: 12/14/21 05:15>





PD HPI CHEST PAIN





- Stated complaint


Stated Complaint: CHEST PX





- Chief complaint


Chief Complaint: Cardiac





PD PAST MEDICAL HISTORY





- Past Medical History


Past Medical History: Yes


Cardiovascular: Hypertension, High cholesterol, Peripheral Vascular Disease, MI


Respiratory: Shortness of breath


Neuro: None


Endocrine/Autoimmune: Type 2 diabetes


GI: Colon polyps


: Incontinence


HEENT: Chronic vision loss, Other


Psych: Depression


Musculoskeletal: Osteoarthritis, Gout, Chronic back pain


Derm: Other





- Past Surgical History


Past Surgical History: Yes


General: Cholecystectomy, Appendectomy, Other


Cardiovascular: Coronary stent





- Present Medications


Home Medications: 


                                Ambulatory Orders











 Medication  Instructions  Recorded  Confirmed


 


Insulin Aspart (Vial) [NovoLOG] 15 - 40 units SUBQ TIDWM 03/14/14 12/18/20


 


allopurinoL [Allopurinol] 100 mg PO DAILY 12/31/15 12/18/20


 


Insulin Glargine [Lantus Solostar] 30 units SQ BID 05/31/16 12/18/20


 


Tolterodine Tartrate [Tolterodine 4 mg PO DAILY PM 07/15/19 12/18/20





Tartrate ER]   


 


Ascorbic Acid [Vitamin C] 500 mg PO DAILY 05/22/20 12/18/20


 


Dulaglutide [Trulicity] 1.5 mg SQ OAW 05/22/20 12/18/20


 


Amlodipine Besylate 5 mg PO DAILY 10/29/20 12/18/20


 


Aspirin Chewable [St Jayson 81 mg PO DAILY PM 10/29/20 12/18/20





Aspirin]   


 


Clopidogrel [Plavix] 75 mg PO DAILY 10/29/20 12/18/20


 


Cyanocobalamin (Vitamin B-12) 1,000 mcg PO DAILY 10/29/20 12/18/20





[Vitamin B-12]   


 


Gabapentin [Neurontin] 300 mg PO DAILY PM 10/29/20 12/18/20


 


Rosuvastatin Calcium 40 mg PO DAILY PM 10/29/20 12/18/20


 


Multivitamin 1 tab PO DAILY 12/18/20 12/18/20


 


Nitroglycerin [Nitrostat] 1 tab PO PRN PRN 12/18/20 12/18/20


 


Spironolactone [Aldactone] 1 tab PO DAILY 12/18/20 12/18/20


 


Meloxicam [Mobic] 7.5 mg PO BID PRN #20 tablet 12/14/21 














- Allergies


Allergies/Adverse Reactions: 


                                    Allergies











Allergy/AdvReac Type Severity Reaction Status Date / Time


 


succinylcholine Allergy Unknown Respiratory Verified 12/14/21 05:09


 


losartan [From Cozaar] Allergy  Unknown Verified 12/14/21 05:09


 


tramadol Allergy  Unknown Verified 12/14/21 05:09


 


bacitracin AdvReac Unknown Rash Verified 12/14/21 05:09





[From Neosporin     





([id number])]     


 


bacitracin zinc * AdvReac Unknown Rash Verified 12/14/21 05:09





[From Neosporin     





([id number])]     


 


diphenhydramine HCl * AdvReac Unknown Edema Verified 12/14/21 05:09





[From Benadryl]     


 


neomycin sulfate * AdvReac Unknown Rash Verified 12/14/21 05:09





[From Neosporin     





([id number])]     


 


polymyxin B AdvReac Unknown Rash Verified 12/14/21 05:09





[From Neosporin     





([id number])]     


 


Sulfa (Sulfonamide AdvReac Unknown Rash Verified 12/14/21 05:09





Antibiotics)     














- Social History


Does the pt smoke?: No


Smoking Status: Never smoker


Does the pt drink ETOH?: No


Does the pt have substance abuse?: No





- Immunizations


Immunizations are current?: Yes





- POLST


Patient has POLST: No





Departure





- Departure


Disposition: 01 Home, Self Care


Clinical Impression: 


 Chest wall pain





Condition: Stable


Instructions:  ED Chest Pain Costochondritis


Follow-Up: 


Heaven Alonzo DO [Primary Care Provider] - 


Prescriptions: 


Meloxicam [Mobic] 7.5 mg PO BID PRN #20 tablet


 PRN Reason: Pain


Comments: 


Daniela, it looks like you have some chest wall pain or costochondritis similar 

to what you have had previously.  Your pain may last 7 to 10 days and I have 

prescribed some meloxicam to use as needed.  This has been E scribed to [facility] 

[facility] in [place]. Drink extra fluids.





<Yayo Mccord - Last Filed: 12/14/21 09:23>





PD HPI CHEST PAIN





- History obtained from


History obtained from: Patient





- History of Present Illness


Timing - onset: How many days ago (4)


Timing - onset during: Rest


Timing - details: Gradual onset, Still present


Quality: Sharp, Pain


Location: Left chest


Radiation: No: Jaw, Neck, Back, Abdominal, Left upper extremity, Right upper 

extremity


Improved by: Rest


Worsened by: Movement, Palpation, Position


Associated symptoms: Cough.  No: Shortness of air, Diaphoresis, Nausea, 

Vomiting, Feeling faint / dizzy, General Weakness, Palpitations


Similar symptoms before: Has not had sx before


Recently seen: Not recently seen





- Additional information


Additional information: 





76-year-old female with a history of coronary artery disease has developed some 

pain in the left chest.  This is worse with inspiration and palpation.  She been

 in to see the emergency department physician 2 days ago and had negative 

troponin at that time.





Review of Systems


Constitutional: denies: Fever


Eyes: denies: Decreased vision


Ears: denies: Ear pain


Nose: denies: Congestion


Throat: denies: Sore throat


Cardiac: reports: Chest pain / pressure.  denies: Palpitations, Pedal edema, 

Calf pain


Respiratory: denies: Dyspnea, Cough


GI: denies: Abdominal Pain, Nausea, Vomiting, Constipation, Diarrhea





PD ED PE NORMAL





- Vitals


Vital signs reviewed: Yes (Tachypneic and hypertensive)





- General


General: Alert and oriented X 3, No acute distress, Well developed/nourished





- HEENT


HEENT: Atraumatic, PERRL, EOMI





- Neck


Neck: Supple, no meningeal sign, No bony TTP





- Cardiac


Cardiac: RRR, No murmur





- Respiratory


Respiratory: No respiratory distress, Clear bilaterally, Other (Chest wall 

tenderness under the left breast which reproduces the symptoms the patient is 

experiencing.  This is reproducible.)





- Abdomen


Abdomen: Normal bowel sounds, Soft, Non tender, Non distended





- Back


Back: No CVA TTP, No spinal TTP





- Derm


Derm: Normal color, Warm and dry, No rash





- Extremities


Extremities: No deformity, No edema





- Neuro


Neuro: CNs 2-12 intact, No motor deficit, No sensory deficit, Normal speech


Eye Opening: Spontaneous


Motor: Obeys Commands


Verbal: Oriented


GCS Score: 15





- Psych


Psych: Normal mood, Normal affect





Results





- Vitals


Vitals: 


                               Vital Signs - 24 hr











  12/14/21 12/14/21 12/14/21





  05:04 07:00 08:31


 


Temperature 36.3 C L  36.5 C


 


Heart Rate 68 72 74


 


Respiratory 25 H 12 18





Rate   


 


Blood Pressure 134/69 H 162/65 H 166/77 H


 


O2 Saturation 99 97 97








                                     Oxygen











O2 Source                      Room air

















- Labs


Labs: 


                                Laboratory Tests











  12/14/21 12/14/21 12/14/21





  05:25 05:25 06:27


 


WBC  9.9  


 


RBC  4.72  


 


Hgb  13.3  


 


Hct  39.4  


 


MCV  83.5  


 


MCH  28.2  


 


MCHC  33.8  


 


RDW  12.6  


 


Plt Count  259  


 


MPV  9.6  


 


Neut # (Auto)  6.0  


 


Lymph # (Auto)  2.6  


 


Mono # (Auto)  0.8  


 


Eos # (Auto)  0.3  


 


Baso # (Auto)  0.0  


 


Absolute Nucleated RBC  0.00  


 


Nucleated RBC %  0.0  


 


Sodium    133 L


 


Potassium    3.7


 


Chloride    103


 


Carbon Dioxide    19 L


 


Anion Gap    11.0


 


BUN    25 H


 


Creatinine    1.0


 


Estimated GFR (MDRD)    54 L


 


Glucose    196 H


 


Calcium    9.8


 


Total Bilirubin    0.7


 


AST    14


 


ALT    15


 


Alkaline Phosphatase    65


 


Troponin I High Sens   7.1 


 


Total Protein    6.6 L


 


Albumin    3.6


 


Globulin    3.0


 


Albumin/Globulin Ratio    1.2


 


Lipase    18 L














- Rads (name of study)


  ** chest


Radiology: Prelim report reviewed (Impression: No acute cardiopulmonary 

abnormality.  There is no significant discrepancy when compared with the 

overnight teleradiology report.), EMP read indepedently, See rad report





PD MEDICAL DECISION MAKING





- ED course


Complexity details: reviewed results, re-evaluated patient, considered 

differential, d/w patient


ED course: 





76-year-old female returns the emerge department with left-sided chest pain she 

has reproducible chest pain to palpation of the left chest wall that reproduces 

the symptoms the patient is complaining about.  She is administered 

dexamethasone and Toradol with resolution of her symptoms.

## 2022-01-18 ENCOUNTER — HOSPITAL ENCOUNTER (OUTPATIENT)
Dept: HOSPITAL 76 - LAB.WCP | Age: 77
Discharge: HOME | End: 2022-01-18
Attending: FAMILY MEDICINE
Payer: MEDICARE

## 2022-01-18 DIAGNOSIS — E11.9: Primary | ICD-10-CM

## 2022-01-18 LAB
ALBUMIN DIAFP-MCNC: 4 G/DL (ref 3.2–5.5)
ALBUMIN/GLOB SERPL: 1.2 {RATIO} (ref 1–2.2)
ALP SERPL-CCNC: 73 IU/L (ref 42–121)
ALT SERPL W P-5'-P-CCNC: 15 IU/L (ref 10–60)
ANION GAP SERPL CALCULATED.4IONS-SCNC: 9 MMOL/L (ref 6–13)
AST SERPL W P-5'-P-CCNC: 14 IU/L (ref 10–42)
BASOPHILS NFR BLD AUTO: 0.1 10^3/UL (ref 0–0.1)
BASOPHILS NFR BLD AUTO: 0.7 %
BILIRUB BLD-MCNC: 1.1 MG/DL (ref 0.2–1)
BUN SERPL-MCNC: 19 MG/DL (ref 6–20)
CALCIUM UR-MCNC: 9.9 MG/DL (ref 8.5–10.3)
CHLORIDE SERPL-SCNC: 104 MMOL/L (ref 101–111)
CO2 SERPL-SCNC: 25 MMOL/L (ref 21–32)
CREAT SERPLBLD-SCNC: 0.9 MG/DL (ref 0.4–1)
EOSINOPHIL # BLD AUTO: 0.4 10^3/UL (ref 0–0.7)
EOSINOPHIL NFR BLD AUTO: 4.9 %
ERYTHROCYTE [DISTWIDTH] IN BLOOD BY AUTOMATED COUNT: 13.4 % (ref 12–15)
EST. AVERAGE GLUCOSE BLD GHB EST-MCNC: 217 MG/DL (ref 70–100)
GFRSERPLBLD MDRD-ARVRAT: 61 ML/MIN/{1.73_M2} (ref 89–?)
GLOBULIN SER-MCNC: 3.3 G/DL (ref 2.1–4.2)
GLUCOSE SERPL-MCNC: 174 MG/DL (ref 70–100)
HBA1C MFR BLD HPLC: 9.2 % (ref 4.27–6.07)
HCT VFR BLD AUTO: 43.6 % (ref 37–47)
HGB UR QL STRIP: 14.2 G/DL (ref 12–16)
LYMPHOCYTES # SPEC AUTO: 2.2 10^3/UL (ref 1.5–3.5)
LYMPHOCYTES NFR BLD AUTO: 30.6 %
MCH RBC QN AUTO: 28.3 PG (ref 27–31)
MCHC RBC AUTO-ENTMCNC: 32.6 G/DL (ref 32–36)
MCV RBC AUTO: 87 FL (ref 81–99)
MONOCYTES # BLD AUTO: 0.7 10^3/UL (ref 0–1)
MONOCYTES NFR BLD AUTO: 9 %
NEUTROPHILS # BLD AUTO: 4 10^3/UL (ref 1.5–6.6)
NEUTROPHILS # SNV AUTO: 7.3 X10^3/UL (ref 4.8–10.8)
NEUTROPHILS NFR BLD AUTO: 54.5 %
NRBC # BLD AUTO: 0 /100WBC
NRBC # BLD AUTO: 0 X10^3/UL
PDW BLD AUTO: 10 FL (ref 7.9–10.8)
PLATELET # BLD: 276 10^3/UL (ref 130–450)
POTASSIUM SERPL-SCNC: 4.2 MMOL/L (ref 3.5–5)
PROT SPEC-MCNC: 7.3 G/DL (ref 6.7–8.2)
RBC MAR: 5.01 10^6/UL (ref 4.2–5.4)
SODIUM SERPLBLD-SCNC: 138 MMOL/L (ref 135–145)

## 2022-01-18 PROCEDURE — 83036 HEMOGLOBIN GLYCOSYLATED A1C: CPT

## 2022-01-18 PROCEDURE — 85025 COMPLETE CBC W/AUTO DIFF WBC: CPT

## 2022-01-18 PROCEDURE — 36415 COLL VENOUS BLD VENIPUNCTURE: CPT

## 2022-01-18 PROCEDURE — 80053 COMPREHEN METABOLIC PANEL: CPT

## 2022-03-10 ENCOUNTER — HOSPITAL ENCOUNTER (OUTPATIENT)
Dept: HOSPITAL 76 - SC | Age: 77
Discharge: HOME | End: 2022-03-10
Attending: NURSE PRACTITIONER
Payer: MEDICARE

## 2022-03-10 VITALS — SYSTOLIC BLOOD PRESSURE: 139 MMHG | DIASTOLIC BLOOD PRESSURE: 77 MMHG

## 2022-03-10 DIAGNOSIS — E66.9: ICD-10-CM

## 2022-03-10 DIAGNOSIS — G47.33: Primary | ICD-10-CM

## 2022-03-10 PROCEDURE — 99212 OFFICE O/P EST SF 10 MIN: CPT

## 2022-03-10 NOTE — SLEEP CARE CONSULTATION
Information from patient questionnaire entered by Constance Brown MA.





I have reviewed and concur with the information entered by Constance Brown MA. 

This document represents the service I personally performed and the decisions 

made by me, Lupe Sanchez ARNP.





History of Present Illness


Service Date and Time: 03/10/2022    1037


Previous diagnosis: Moderate, Obstructive Sleep Apnea-Hypopnea Syndrome


AHI: 23.8 (in 2021)(21.9 in 2011)


Reason for follow up: other (3 MONTH F/U POSITIONAL THERAPY)


Prior sleep studies: Yes


Year and Where: 2021 - [facility]


Type of Sleep Study: Home sleep study


HPI additional information: 





Daniela Yun was diagnosed to have moderate, AHI 23.8, obstructive sleep 

apnea-hypopnea syndrome and returned today for Positional therapy three month 

follow-up.





Sleep Study





- Results


Type of Sleep Study: Home sleep study


Prior sleep studies: Yes


[facility]





CPAP Compliance Data


Compliance data discussion: 





She states she is able to sleep on her side with her head elevated without any 

issues. 





Subjective


On therapy, patient: reports: sleeping better, awakening more refreshed, being 

more awake and alert during the day, more rested overall.  denies: drowsiness 

while driving


Initial Winston Sleepiness Scale score: 7 (in 2020)


Current Winston Sleepiness Scale score: 6 (2022)





Allergies and Home Medications


Home medication list reviewed: Yes (no changes)


Allergy and home medication list: 


Allergies





succinylcholine Allergy (Unknown, Verified 12/14/21 05:09)


   Respiratory


   "coma for 4 days" 


losartan [From Cozaar] Allergy (Verified 12/14/21 05:09)


   Unknown


tramadol Allergy (Verified 12/14/21 05:09)


   Unknown


bacitracin [From Neosporin ([id number])] Adverse Reaction (Unknown, Verified 

12/14/21 05:09)


   Rash


bacitracin zinc * [From Neosporin ([id number]-[id number])] Adverse Reaction (Unknown, 

Verified 12/14/21 05:09)


   Rash


diphenhydramine HCl * [From Benadryl] Adverse Reaction (Unknown, Verified 

12/14/21 05:09)


   Edema


neomycin sulfate * [From Neosporin ([id number])] Adverse Reaction (Unknown, 

Verified 12/14/21 05:09)


   Rash


polymyxin B [From Neosporin (zug-awh-[id number])] Adverse Reaction (Unknown, Verified

12/14/21 05:09)


   Rash


Sulfa (Sulfonamide Antibiotics) Adverse Reaction (Unknown, Verified 12/14/21 

05:09)


   Rash








Review of Systems


Review of systems same as previous: Yes (no changes)





Physical Exam


Vital signs obtained and entered by: BERHANE BROWN CMA AAMA


Blood Pressure: 139/77 (right, pulse 65, resp 20, )


Heart Rate: 63


O2 Saturation: 99 (paper mask)


Height: 5 ft


Weight: 202 lb (cothes)


Weight change since last visit: 6 lb gain


Body Mass Index: 39.4


BMI Classification: Obese





Impression and Plan





1. Obstructive Sleep Apnea-Hypopnea Syndrome, moderate. Using positional 

therapy, the patient has better sleep quality and is more rested overall. 

Patient has been able to sleep on her side with head elevated without a problem.

She states she would rarely sleep on her back before she started this positional

therapy. She states she feels rested in the mornings and has more energy, is 

doing more during the day recently. Patient's apnea severity and rationale for 

treatment to reduce apnea, improve sleep quality and reduce cardiovascular and 

cerebrovascular events was reviewed.





2. Obesity, unspecified. Patient has gained weight. Currently patients BMI is 

39.4.  Obesity increases the risk of apnea, CPAP pressure requirements and 

overall health risks especially cardiovascular and diabetes. Thus patient is 

advised to lose weight. Weight loss can be done with reducing portion size, 

reducing refined foods and balancing content with vegetables, fruit and whole 

grain foods. In addition, patient encouraged to get regular exercise.  








   -Continue positional therapy


   -Attempt to start losing weight


   -Call this office if any problems


   -Return for follow up in 6 months, or sooner if concerns arise








Counseling Topics: Sleeping position, Weight loss health impact


Visit Type: In Office


Time Spent with Patient (minutes): 15


Provider Statement: I spent 100% of the Face to Face Visit with the patient with

greater than 50% spent counseling the patient and coordination of care.

## 2022-07-22 ENCOUNTER — HOSPITAL ENCOUNTER (OUTPATIENT)
Dept: HOSPITAL 76 - LAB.N | Age: 77
Discharge: HOME | End: 2022-07-22
Attending: FAMILY MEDICINE
Payer: MEDICARE

## 2022-07-22 DIAGNOSIS — E11.9: Primary | ICD-10-CM

## 2022-07-22 LAB
ALBUMIN DIAFP-MCNC: 3.9 G/DL (ref 3.2–5.5)
ALBUMIN/GLOB SERPL: 1.1 {RATIO} (ref 1–2.2)
ALP SERPL-CCNC: 77 IU/L (ref 42–121)
ALT SERPL W P-5'-P-CCNC: 23 IU/L (ref 10–60)
ANION GAP SERPL CALCULATED.4IONS-SCNC: 8 MMOL/L (ref 6–13)
AST SERPL W P-5'-P-CCNC: 21 IU/L (ref 10–42)
BILIRUB BLD-MCNC: 0.8 MG/DL (ref 0.2–1)
BUN SERPL-MCNC: 19 MG/DL (ref 6–20)
CALCIUM UR-MCNC: 10.2 MG/DL (ref 8.5–10.3)
CHLORIDE SERPL-SCNC: 100 MMOL/L (ref 101–111)
CO2 SERPL-SCNC: 29 MMOL/L (ref 21–32)
CREAT SERPLBLD-SCNC: 1.1 MG/DL (ref 0.4–1)
EST. AVERAGE GLUCOSE BLD GHB EST-MCNC: 223 MG/DL (ref 70–100)
GFRSERPLBLD MDRD-ARVRAT: 48 ML/MIN/{1.73_M2} (ref 89–?)
GLOBULIN SER-MCNC: 3.4 G/DL (ref 2.1–4.2)
GLUCOSE SERPL-MCNC: 197 MG/DL (ref 70–100)
HBA1C MFR BLD HPLC: 9.4 % (ref 4.27–6.07)
POTASSIUM SERPL-SCNC: 4.6 MMOL/L (ref 3.5–5)
PROT SPEC-MCNC: 7.3 G/DL (ref 6.7–8.2)
SODIUM SERPLBLD-SCNC: 137 MMOL/L (ref 135–145)

## 2022-07-22 PROCEDURE — 80053 COMPREHEN METABOLIC PANEL: CPT

## 2022-07-22 PROCEDURE — 83036 HEMOGLOBIN GLYCOSYLATED A1C: CPT

## 2022-07-22 PROCEDURE — 36415 COLL VENOUS BLD VENIPUNCTURE: CPT

## 2022-07-31 ENCOUNTER — HOSPITAL ENCOUNTER (OUTPATIENT)
Dept: HOSPITAL 76 - DI | Age: 77
Discharge: HOME | End: 2022-07-31
Attending: NURSE PRACTITIONER
Payer: MEDICARE

## 2022-07-31 DIAGNOSIS — Z90.49: ICD-10-CM

## 2022-07-31 DIAGNOSIS — R10.11: Primary | ICD-10-CM

## 2022-07-31 NOTE — ULTRASOUND REPORT
PROCEDURE: Abdomen Limited

 

INDICATIONS:  RUQ PAIN

 

TECHNIQUE:  

Real-time focused scanning was performed of the abdomen, with image documentation.  

 

COMPARISON:  None

 

FINDINGS:  Gallbladder is surgically absent. No dilated ducts. Common hepatic duct measures 3.4 mm. C
ommon bile duct measures 6.0 mm.

 

Liver is mildly heterogeneous and mildly of increased echogenicity. Question mild diffuse hepatic leon
atosis.

 

Visualized portions of pancreas are unremarkable.

 

Right kidney is unremarkable. It measures 9.0 cm. There is no right hydronephrosis.

 

IMPRESSION:  

 

1. Remote cholecystectomy, no dilated ducts.

 

2. Question mild diffuse hepatic steatosis.

 

Reviewed by: Arpan Tyler MD on 7/31/2022 7:37 AM [facility]

Approved by: Arpan Tyler MD on 7/31/2022 7:37 AM [facility]

 

 

Station ID:  [id number]

## 2022-08-25 ENCOUNTER — HOSPITAL ENCOUNTER (OUTPATIENT)
Dept: HOSPITAL 76 - DI | Age: 77
Discharge: HOME | End: 2022-08-25
Attending: NURSE PRACTITIONER
Payer: MEDICARE

## 2022-08-25 DIAGNOSIS — R91.8: ICD-10-CM

## 2022-08-25 DIAGNOSIS — R93.89: ICD-10-CM

## 2022-08-25 DIAGNOSIS — R93.5: Primary | ICD-10-CM

## 2022-08-25 PROCEDURE — 76830 TRANSVAGINAL US NON-OB: CPT

## 2022-08-25 PROCEDURE — 71260 CT THORAX DX C+: CPT

## 2022-08-25 PROCEDURE — 76856 US EXAM PELVIC COMPLETE: CPT

## 2022-08-26 NOTE — CT REPORT
PROCEDURE:  CHEST W

 

INDICATIONS:  ABN ABD CT, ENDOMETRIAL THICKENING

 

CONTRAST: 100 mL Omnipaque 350 

 

TECHNIQUE:  

After the administration of intravenous contrast, 1 mm axial images were acquired from the pulmonary 
apices through the posterior costophrenic angles.  Axial 5 mm soft tissue kernel reconstructions were
 performed as well as 8 mm axial MIP and coronal and sagittal 5 mm reformations.  For radiation dose 
reduction, the following was used:  automated exposure control, adjustment of mA and/or kV according 
to patient size.

 

COMPARISON:  CT abdomen pelvis 8/12/2022. CT abdomen pelvis 7/8/2016.

 

FINDINGS:  

Image quality:  Excellent.  

Images are denoted as (series #/image #).

 

Lymph nodes: No highly suspicious lymph nodes visualized.

 

Vasculature: Aorta and main pulmonary artery diameters are within normal range.

 

Heart: No pericardial effusion. Coronary artery calcifications are present.

 

Lung parenchyma and pleura: Redemonstrated irregular consolidative opacity with adjacent groundglass 
opacity at the medial aspect of the right lower lobe. This measures approximately 2.7 x 1.3 cm (4/214
) previously 3.0 x 2.4 cm, likely not significantly changed allowing for measurement variation.

 

A few small pulmonary nodules are present elsewhere, examples include:

-Right upper lobe: 7 mm groundglass nodule (4/64).

-Left upper lobe: 3 mm solid nodule (4/58).

 

No pleural effusion.

 

Airways: Centrally patent.

 

Chest wall/musculoskeletal: Multilevel degenerative change of the visualized spine. Changes of DISH a
re present.

 

Visualized upper abdomen: Small hiatal hernia. Partially imaged ventral abdominal hernia better evalu
ated on prior CT abdomen pelvis. Prior cholecystectomy. Redemonstrated small left adrenal adenoma.

 

IMPRESSION:

 

1. No significant interval change in the irregular consolidative and nodular opacity at the medial as
pect of the right lower lobe. This could represent a lung neoplasm however sequela of infection or in
flammation is also possible. Pulmonology consultation may be helpful to direct further management. Sh
ort interval follow-up to assess for change and/or PET CT may be helpful for further evaluation.

2. No definite thoracic adenopathy demonstrated. No pleural effusion.

3. Scattered small pulmonary nodules are present, nonspecific. These can be followed on future follow
-up exams.

 

 

Reviewed by: Hector Wells MD on 8/26/2022 9:10 AM PDT

Approved by: Hector Wells MD on 8/26/2022 9:10 AM PDT

 

 

Station ID:  [id number]

## 2022-08-29 NOTE — ULTRASOUND REPORT
PROCEDURE:  Pelvic w/Transvaginal

 

INDICATIONS:  ABN ABD CT, ENDOMETRIAL THICKENING. Per patient, no recent pain or vaginal bleeding. No
t on hormone replacement therapy.

 

TECHNIQUE:  

Real-time scanning was performed of the pelvic organs, with image documentation.  Additional endovagi
nal scanning was necessary due to incomplete visualization of the adnexal and endometrial structures 
by transabdominal scanning.  

 

COMPARISON:  CT abdomen pelvis 8/12/2022.

 

FINDINGS:  

 

Uterus:  Uterus is anteverted and normal in size for age at 7.4 x 3.4 x 4.6 cm.  The myometrium is mi
ldly heterogeneous. A left posterior uterine body intramural fibroid is present measuring 1.0 x 0.9 x
 1.4 cm.

 

The endometrium measures 10 mm in combined thickness.  No abnormal endometrial vascularity identified
 on Doppler images. The endometrium appears heterogeneous and there may be a small amount of fluid in
 the uterine cavity.

 

Ovaries: The ovaries are not visualized, likely due to bowel gas and/or senescent change. No adnexal 
masses are seen.

 

Other:  No pathologic free abdominal or pelvic fluid.

 

 

IMPRESSION:  

1. The endometrium measures up to 10 mm, greater than expected for a postmenopausal patient. Differen
tial considerations would include endometrial hyperplasia or carcinoma. Gynecology consultation with 
consideration of endometrial biopsy may be helpful for further evaluation.

2. The ovaries are not visualized, likely due to bowel gas and/or senescent change. No adnexal masses
 are seen.

 

Reviewed by: Hector Wells MD on 8/29/2022 8:05 AM PDT

Approved by: Hector Wells MD on 8/29/2022 8:05 AM PDT

 

 

Station ID:  [id number]

## 2022-10-04 ENCOUNTER — HOSPITAL ENCOUNTER (OUTPATIENT)
Dept: HOSPITAL 76 - LAB | Age: 77
Discharge: HOME | End: 2022-10-04
Attending: OBSTETRICS & GYNECOLOGY
Payer: MEDICARE

## 2022-10-04 DIAGNOSIS — E11.65: ICD-10-CM

## 2022-10-04 DIAGNOSIS — R93.89: ICD-10-CM

## 2022-10-04 DIAGNOSIS — Z01.818: Primary | ICD-10-CM

## 2022-10-04 DIAGNOSIS — E78.5: ICD-10-CM

## 2022-10-04 DIAGNOSIS — E11.8: ICD-10-CM

## 2022-10-04 DIAGNOSIS — I10: ICD-10-CM

## 2022-10-04 LAB
ALBUMIN DIAFP-MCNC: 3.9 G/DL (ref 3.2–5.5)
ALBUMIN/GLOB SERPL: 1.1 {RATIO} (ref 1–2.2)
ALP SERPL-CCNC: 71 IU/L (ref 42–121)
ALT SERPL W P-5'-P-CCNC: 15 IU/L (ref 10–60)
ANION GAP SERPL CALCULATED.4IONS-SCNC: 8 MMOL/L (ref 6–13)
AST SERPL W P-5'-P-CCNC: 17 IU/L (ref 10–42)
BASOPHILS NFR BLD AUTO: 0.1 10^3/UL (ref 0–0.1)
BASOPHILS NFR BLD AUTO: 0.6 %
BILIRUB BLD-MCNC: 0.6 MG/DL (ref 0.2–1)
BUN SERPL-MCNC: 26 MG/DL (ref 6–20)
CALCIUM UR-MCNC: 10 MG/DL (ref 8.5–10.3)
CHLORIDE SERPL-SCNC: 103 MMOL/L (ref 101–111)
CHOLEST SERPL-MCNC: 129 MG/DL
CO2 SERPL-SCNC: 25 MMOL/L (ref 21–32)
CREAT SERPLBLD-SCNC: 1.1 MG/DL (ref 0.4–1)
EOSINOPHIL # BLD AUTO: 0.3 10^3/UL (ref 0–0.7)
EOSINOPHIL NFR BLD AUTO: 3 %
ERYTHROCYTE [DISTWIDTH] IN BLOOD BY AUTOMATED COUNT: 13.2 % (ref 12–15)
EST. AVERAGE GLUCOSE BLD GHB EST-MCNC: 180 MG/DL (ref 70–100)
GFRSERPLBLD MDRD-ARVRAT: 48 ML/MIN/{1.73_M2} (ref 89–?)
GLOBULIN SER-MCNC: 3.4 G/DL (ref 2.1–4.2)
GLUCOSE SERPL-MCNC: 188 MG/DL (ref 70–100)
HBA1C MFR BLD HPLC: 7.9 % (ref 4.27–6.07)
HCT VFR BLD AUTO: 42.4 % (ref 37–47)
HDLC SERPL-MCNC: 51 MG/DL
HDLC SERPL: 2.5 {RATIO} (ref ?–4.4)
HGB UR QL STRIP: 14 G/DL (ref 12–16)
LDLC SERPL CALC-MCNC: 59 MG/DL
LDLC/HDLC SERPL: 1.2 {RATIO} (ref ?–4.4)
LYMPHOCYTES # SPEC AUTO: 2.2 10^3/UL (ref 1.5–3.5)
LYMPHOCYTES NFR BLD AUTO: 25.8 %
MCH RBC QN AUTO: 28.9 PG (ref 27–31)
MCHC RBC AUTO-ENTMCNC: 33 G/DL (ref 32–36)
MCV RBC AUTO: 87.6 FL (ref 81–99)
MONOCYTES # BLD AUTO: 0.7 10^3/UL (ref 0–1)
MONOCYTES NFR BLD AUTO: 8.2 %
NEUTROPHILS # BLD AUTO: 5.2 10^3/UL (ref 1.5–6.6)
NEUTROPHILS # SNV AUTO: 8.3 X10^3/UL (ref 4.8–10.8)
NEUTROPHILS NFR BLD AUTO: 62.2 %
NRBC # BLD AUTO: 0 /100WBC
NRBC # BLD AUTO: 0 X10^3/UL
PDW BLD AUTO: 9.2 FL (ref 7.9–10.8)
PLATELET # BLD: 242 10^3/UL (ref 130–450)
POTASSIUM SERPL-SCNC: 4.4 MMOL/L (ref 3.5–5)
PROT SPEC-MCNC: 7.3 G/DL (ref 6.7–8.2)
RBC MAR: 4.84 10^6/UL (ref 4.2–5.4)
SODIUM SERPLBLD-SCNC: 136 MMOL/L (ref 135–145)
TRIGL P FAST SERPL-MCNC: 94 MG/DL
TSH SERPL-ACNC: 2.97 UIU/ML (ref 0.34–5.6)
VLDLC SERPL-SCNC: 19 MG/DL

## 2022-10-04 PROCEDURE — 83721 ASSAY OF BLOOD LIPOPROTEIN: CPT

## 2022-10-04 PROCEDURE — 83036 HEMOGLOBIN GLYCOSYLATED A1C: CPT

## 2022-10-04 PROCEDURE — 85025 COMPLETE CBC W/AUTO DIFF WBC: CPT

## 2022-10-04 PROCEDURE — 80053 COMPREHEN METABOLIC PANEL: CPT

## 2022-10-04 PROCEDURE — 80061 LIPID PANEL: CPT

## 2022-10-04 PROCEDURE — 84443 ASSAY THYROID STIM HORMONE: CPT

## 2022-10-04 PROCEDURE — 93005 ELECTROCARDIOGRAM TRACING: CPT

## 2022-10-04 PROCEDURE — 36415 COLL VENOUS BLD VENIPUNCTURE: CPT

## 2022-10-11 ENCOUNTER — HOSPITAL ENCOUNTER (OUTPATIENT)
Dept: HOSPITAL 76 - SDS | Age: 77
Discharge: HOME | End: 2022-10-11
Attending: OBSTETRICS & GYNECOLOGY
Payer: MEDICARE

## 2022-10-11 VITALS — SYSTOLIC BLOOD PRESSURE: 141 MMHG | DIASTOLIC BLOOD PRESSURE: 89 MMHG

## 2022-10-11 DIAGNOSIS — N84.0: ICD-10-CM

## 2022-10-11 DIAGNOSIS — R93.89: Primary | ICD-10-CM

## 2022-10-11 DIAGNOSIS — E11.42: ICD-10-CM

## 2022-10-11 DIAGNOSIS — E66.01: ICD-10-CM

## 2022-10-11 DIAGNOSIS — I10: ICD-10-CM

## 2022-10-11 DIAGNOSIS — Z79.4: ICD-10-CM

## 2022-10-11 PROCEDURE — 0UB98ZX EXCISION OF UTERUS, VIA NATURAL OR ARTIFICIAL OPENING ENDOSCOPIC, DIAGNOSTIC: ICD-10-PCS | Performed by: OBSTETRICS & GYNECOLOGY

## 2022-10-11 PROCEDURE — 0UDB8ZX EXTRACTION OF ENDOMETRIUM, VIA NATURAL OR ARTIFICIAL OPENING ENDOSCOPIC, DIAGNOSTIC: ICD-10-PCS | Performed by: OBSTETRICS & GYNECOLOGY

## 2022-10-11 PROCEDURE — 58558 HYSTEROSCOPY BIOPSY: CPT

## 2022-10-11 NOTE — ANESTHESIA
Pre-Anesthesia VS, & Labs





- Diagnosis





ENDOMETRIAL THICKENING





- Procedure





hysteroscopy, D&C


Vital Signs: 





                                        











Temp Pulse Resp BP Pulse Ox O2 Flow Rate


 


 36.8 C   63   16   150/108 H  97   0 


 


 10/11/22 07:17  10/11/22 07:17  10/11/22 07:17  10/11/22 07:17  10/11/22 07:17 

10/11/22 07:17











Height: 4 ft 11 in


Weight (kg): 91.2 kg


Body Mass Index: 40.6


BMI Classification: Morbidly Obese





- NPO


>8 hours





- Pregnancy


Is Patient Pregnant?: No





- Lab Results


Current Lab Results: 





Laboratory Tests





10/11/22 07:36: POC Whole Bld Glucose 210 H











Home Medications and Allergies





Active Medications





Misoprostol (Misoprostol 100 Mcg Tablet)  200 mcg BC ONCE SOCO





                                        





Insulin Aspart (Vial) [NovoLOG] 16 units SUBQ TIDWM 03/14/14 


Insulin Glargine [Lantus Solostar] 16 units SQ BID 05/31/16 


Dulaglutide [Trulicity] 4.5 mg SQ OAW 05/22/20 


Amlodipine Besylate 5 mg PO DAILY 10/29/20 


Aspirin Chewable [[place] Aspirin] 81 mg PO DAILY PM 10/29/20 


Gabapentin [Neurontin] 300 mg PO DAILY PM 10/29/20 


Rosuvastatin Calcium 40 mg PO DAILY PM 10/29/20 


Nitroglycerin [Nitrostat] 1 tab PO PRN PRN 12/18/20 


Spironolactone [Aldactone] 25 mg PO DAILY 12/18/20 








Allergies/Adverse Reactions: 


                                    Allergies











Allergy/AdvReac Type Severity Reaction Status Date / Time


 


succinylcholine Allergy Unknown Respiratory Verified 12/14/21 05:09


 


losartan [From Cozaar] Allergy  Unknown Verified 12/14/21 05:09


 


tramadol Allergy  Unknown Verified 12/14/21 05:09


 


bacitracin AdvReac Unknown Rash Verified 12/14/21 05:09





[From Neosporin     





([id number])]     


 


bacitracin zinc * AdvReac Unknown Rash Verified 12/14/21 05:09





[From Neosporin     





([id number])]     


 


diphenhydramine HCl * AdvReac Unknown Edema Verified 12/14/21 05:09





[From Benadryl]     


 


neomycin sulfate * AdvReac Unknown Rash Verified 12/14/21 05:09





[From Neosporin     





([id number])]     


 


polymyxin B AdvReac Unknown Rash Verified 12/14/21 05:09





[From Neosporin     





([id number]-[id number])]     


 


Sulfa (Sulfonamide AdvReac Unknown Rash Verified 12/14/21 05:09





Antibiotics)     














Anes History & Medical History





- Anesthetic History


Anesthesia Complications: reports: No previous complications, Malignant 

Hyperthermia, Pseudocholinesterase deficiency, Other-see comment (true allergy 

to succinylcholine, unknown if it is MH vs psuedocholinesterase deficiency)





- Medical History


Cardiovascular: reports: Hypertension, High cholesterol, Peripheral Vascular 

Disease, Deep vein thrombosis, Angina, Other (stent in 2019)


Pulmonary: reports: Sleep apnea


Gastrointestinal: reports: GERD, Colon polyps, Chronic constipation


Urinary: reports: Incontinence


Neuro: reports: None


Musculoskeletal: reports: Osteoarthritis, Gout, Chronic back pain


Endocrine/Autoimmune: reports: Type 2 diabetes


Blood Disorders: reports: None


Skin: reports: None, Other


Smoking Status: Never smoker





- Surgical History


General: reports: Cholecystectomy, Appendectomy, Colonoscopy, Other


Cardiothoracic: reports: Coronary stent





Exam


General: Alert, Oriented x3, Cooperative


Dental: Other (edentulous)


Mouth Opening: 3 Fingerbreadth


Neck Mobility: Normal


Mallampati classification: II


Respiratory: Lungs clear


Cardiovascular: Regular rate





Plan


Anesthesia Type: General (TIVA), Total IV


Consent for Procedure(s) Verified and Reviewed: Yes


Code Status: Attempt Resuscitation


ASA classification: 3-Severe systemic disease


Is this case an emergency?: No

## 2022-10-11 NOTE — ANESTHESIA POST OP EVALUATION
Anesthesia Post Eval





- Post Anesthesia Eval


Vitals: 





                                Last Vital Signs











Temp  36 C L  10/11/22 11:15


 


Pulse  66   10/11/22 11:15


 


Resp  16   10/11/22 11:15


 


BP  141/89 H  10/11/22 11:15


 


Pulse Ox  100   10/11/22 11:15


 


O2 Flow Rate  0   10/11/22 07:17











CV Function Including HR & BP: Stable


Pain Control: Satisfactory


Nausea & Vomiting: Negative


Mental Status: Baseline


Respiratory Status: Airway Patent


Hydration Status: Satisfactory


Anesthesia Complications: None

## 2022-10-11 NOTE — OPERATIVE REPORT
Operative Report





- General


Planned Procedure: 





Dilation and curettage


Hysteroscopy


Pre-Op Diagnosis: Thickened endometrium


Procedure Performed: 





Dilation and curettage


Operative hysteroscopy, polypectomy


Post Op Diagnosis: Endometrial polyps





- Procedure Note


Primary Surgeon: Cait Gordon DO


Anesthesia Provider: Shanell Mc CNA


Anesthesia Technique: Other (General)


Pathology: 





Endometrial curettings


Endometrial polyps


Estimated Blood Loss (mL): 20


Urine Output (mL): 40


Indications: 


Thickened endometrium


Findings: 





Endocervical adhesions


Endometrial polyps





Fluid deficit 1035cc


Complications: 





None





- Other


Other Information/Narrative: 


Patient taken to OR where general anesthesia obtained without difficulty. Placed

in dorsal lithotomy position in Ulisses stirrups. Prepped and draped in sterile 

fashion. Croft catheter inserted. [place] speculum placed in vagina. Anterior 

lip of cervix grasped with single tooth tenaculum. Hegar dilators used for dil

ation. Unable to pass through cervix. Hysteroscope introduced. Cervical 

adhesions noted. Myosure lite used to resect some adhesions. Hysteroscope 

removed. Hemostat used to break up some of the adhesions. Dilators reintroduced 

and were able to pass through cervix. Uterus then sounded to 7cm. Hysteroscope 

introduced and aforementioned findings noted, polyps. Myosure lite used to 

resect polyps and obtain EMC. Uterus now clear of polyps. Hysteroscope removed. 

EMC obtained with curette. Tenaculum removed. Hemostasis. Speculum removed. 

Patient tolerated well. Counts correct x2. Patient taken to recovery in stable 

condition.

## 2022-11-12 ENCOUNTER — HOSPITAL ENCOUNTER (OUTPATIENT)
Dept: HOSPITAL 76 - DI | Age: 77
Discharge: HOME | End: 2022-11-12
Attending: NURSE PRACTITIONER
Payer: MEDICARE

## 2022-11-12 DIAGNOSIS — R91.8: Primary | ICD-10-CM

## 2022-11-12 NOTE — CT REPORT
PROCEDURE:  CHEST WO

 

INDICATIONS:  PULMONARY NODULE

 

TECHNIQUE: 

Noncontrast 1mm axial images were acquired from the pulmonary apices to the posterior costophrenic an
gles.  Axial 5 mm soft tissue kernel reconstructions were performed as well as 8 mm axial MIP and cor
onal and sagittal 5 mm reformations. For radiation dose reduction, the following was used: automate
d exposure control, adjustment of mA and/or kV according to patient size.

 

COMPARISON:  8/25/2022, 8/12/2022, 7/8/2016

 

FINDINGS:  

Image quality:  Excellent.  

 

Lungs and pleura:  Within the right lower lobe medially, there is again seen a poorly defined irregul
ar area of consolidation that measures 3 x 1.3 cm in greatest axial dimension.

 

No acute air space opacities.  No pleural effusions or pneumothorax.  Central and peripheral airways 
are patent and normal in caliber.  

 

Mediastinum:  Heart size is normal.  No pericardial effusion.  Moderate coronary artery calcification
 is seen. No mediastinal adenopathy by size criteria.  Thoracic aorta and central pulmonary arteries 
are normal in size.  Esophagus is normal in caliber. There is a small hiatal hernia.  

 

Bones and chest wall:  No suspicious bony lesions.  No vertebral body compression fractures.  No axil
yogi or supraclavicular adenopathy by size criteria.  The thyroid is normal in size and there are no 
incidental findings.

 

Abdomen: Cholecystectomy clips are seen. A left adrenal adenoma is again seen, which measures -5 Houn
sfield units. Mesh placement can be seen involving the anterior abdominal wall of the right upper kirby
drant. The visualized portions of the upper abdominal structures are otherwise within normal limits. 
 

 

 

 

IMPRESSION:  

There is a persistent opacity seen involving the right lower lobe. This is nonspecific, although diff
erential diagnosis includes neoplasm. Please now consider a dedicated PET CT for further evaluation.

 

No enlarged mediastinal lymph nodes are seen.

 

 

Incidental note is made of:

Moderate coronary artery calcification

Small hiatal hernia

Cholecystectomy

Stable, lipid rich adrenal adenoma.

Right upper quadrant anterior abdominal wall mesh placement

 

Reviewed by: Leon Guthrie MD on 11/12/2022 10:04 AM [facility]

Approved by: Leon Guthrie MD on 11/12/2022 10:04 AM [facility]

 

 

Station ID:  [id number]

## 2022-12-12 ENCOUNTER — HOSPITAL ENCOUNTER (EMERGENCY)
Dept: HOSPITAL 76 - ED | Age: 77
Discharge: HOME | End: 2022-12-12
Payer: MEDICARE

## 2022-12-12 VITALS — DIASTOLIC BLOOD PRESSURE: 75 MMHG | SYSTOLIC BLOOD PRESSURE: 143 MMHG
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
## 2022-12-12 DIAGNOSIS — I10: ICD-10-CM

## 2022-12-12 DIAGNOSIS — S80.01XA: Primary | ICD-10-CM
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
## 2022-12-12 DIAGNOSIS — E11.51: ICD-10-CM

## 2022-12-12 DIAGNOSIS — G47.30: ICD-10-CM

## 2022-12-12 DIAGNOSIS — Z79.4: ICD-10-CM

## 2022-12-12 PROCEDURE — 99283 EMERGENCY DEPT VISIT LOW MDM: CPT

## 2022-12-12 PROCEDURE — 99282 EMERGENCY DEPT VISIT SF MDM: CPT

## 2022-12-12 PROCEDURE — 73564 X-RAY EXAM KNEE 4 OR MORE: CPT

## 2022-12-12 NOTE — XRAY REPORT
PROCEDURE:  Knee 4 View RT

 

INDICATIONS:  Trauma

 

TECHNIQUE:  3 views of the right knee(s) were acquired.  

 

COMPARISON:  None.

 

FINDINGS:  

 

Bones:  No fractures or dislocations.  No suspicious bony lesions.  Degenerative arthritis with trico
mpartment osteophytes and severe medial compartment joint space loss.

 

Soft tissues:  Small joint effusion.  No suspicious soft tissue calcifications.  

 

 

IMPRESSION:  Degenerative arthritis. No evidence acute bony abnormality of the right knee.

 

If clinical suspicion and/or symptoms persist, further assessment with repeat plain films or advanced
 imaging (e.g., CT, MRI, or bone scan) may be helpful for further assessment.

 

Reviewed by: Arpan Tyler MD on 12/12/2022 10:24 AM PST

Approved by: Arpan Tyler MD on 12/12/2022 10:24 AM PST

 

 

Station ID:  [id number]

## 2022-12-12 NOTE — ED PHYSICIAN DOCUMENTATION
History of Present Illness





- Stated complaint


Stated Complaint: FALL,R KNEE PX





- Chief complaint


Chief Complaint: Trauma Ext





- History obtained from


History obtained from: Patient





- History of Present Illness


Timing: Yesterday


Pain level max: 6


Pain level now: 5





- Additonal information


Additional information: 


77 year old year old female with R knee pain after tripping over a dog at home 

last night. able to walk today, but has pain in the knee. has not taken anything

for pain.  Worse with walking, better with rest.  No head, neck, back pain.  No 

loss of consciousness.  Does not take any blood thinners.





Review of Systems


Constitutional: denies: Fever, Chills


Respiratory: denies: Cough


GI: denies: Nausea, Vomiting, Diarrhea


Skin: denies: Rash


Musculoskeletal: denies: Neck pain, Back pain


Neurologic: denies: Focal weakness, Numbness, Headache





PD PAST MEDICAL HISTORY





- Past Medical History


Cardiovascular: Hypertension, High cholesterol, Peripheral Vascular Disease, 

Deep vein thrombosis, Angina, Other


Respiratory: Sleep apnea


Neuro: None


Endocrine/Autoimmune: Type 2 diabetes


GI: GERD, Colon polyps, Chronic constipation


: Incontinence


HEENT: Chronic vision loss, Other


Psych: Depression


Musculoskeletal: Osteoarthritis, Gout, Chronic back pain


Derm: None, Other





- Past Surgical History


Past Surgical History: Yes


General: Cholecystectomy, Appendectomy, Colonoscopy, Other


Cardiovascular: Coronary stent





- Present Medications


Home Medications: 


                                Ambulatory Orders











 Medication  Instructions  Recorded  Confirmed


 


Insulin Aspart (Vial) [NovoLOG] 16 units SUBQ TIDWM 03/14/14 12/01/22


 


Insulin Glargine [Lantus Solostar] 16 units SQ BID 05/31/16 12/01/22


 


Dulaglutide [Trulicity] 4.5 mg SQ OAW 05/22/20 12/01/22


 


Amlodipine Besylate 5 mg PO DAILY 10/29/20 12/01/22


 


Aspirin Chewable [St Jayson 81 mg PO DAILY PM 10/29/20 12/01/22





Aspirin]   


 


Gabapentin [Neurontin] 300 mg PO DAILY PM 10/29/20 12/01/22


 


Rosuvastatin Calcium 40 mg PO DAILY PM 10/29/20 12/01/22


 


Nitroglycerin [Nitrostat] 1 tab PO PRN PRN 12/18/20 12/01/22


 


Spironolactone [Aldactone] 25 mg PO DAILY 12/18/20 12/01/22


 


Cholecalciferol [Vitamin D3] See Rx Instructions .ROUTE .COMPLEX 12/01/22 12/01/22


 


Magnesium Oxide [Magnesium] See Rx Instructions .ROUTE .COMPLEX 12/01/22 12/01/22














- Allergies


Allergies/Adverse Reactions: 


                                    Allergies











Allergy/AdvReac Type Severity Reaction Status Date / Time


 


succinylcholine Allergy Unknown Respiratory Verified 12/01/22 13:01


 


losartan [From Cozaar] Allergy  Unknown Verified 12/12/22 09:55


 


tramadol Allergy  Unknown Verified 12/12/22 09:55


 


bacitracin AdvReac Unknown Rash Verified 12/12/22 09:55





[From Neosporin     





([id number])]     


 


bacitracin zinc * AdvReac Unknown Rash Verified 12/12/22 09:55





[From Neosporin     





([id number])]     


 


diphenhydramine HCl * AdvReac Unknown Edema Verified 12/12/22 09:55





[From Benadryl]     


 


neomycin sulfate * AdvReac Unknown Rash Verified 12/12/22 09:55





[From Neosporin     





([id number])]     


 


polymyxin B AdvReac Unknown Rash Verified 12/12/22 09:55





[From Neosporin     





([id number]-hoeew)]     


 


Sulfa (Sulfonamide AdvReac Unknown Rash Verified 12/12/22 09:55





Antibiotics)     














- Social History


Does the pt smoke?: No


Smoking Status: Never smoker


Does the pt drink ETOH?: No


Does the pt have substance abuse?: No





- Immunizations


Immunizations are current?: Yes





- POLST


Patient has POLST: No





PD ED PE NORMAL





- Vitals


Vital signs reviewed: Yes





- General


General: Alert and oriented X 3, No acute distress





- HEENT


HEENT: Atraumatic, PERRL, Moist mucous membranes





- Neck


Neck: Supple, no meningeal sign, No bony TTP





- Cardiac


Cardiac: RRR





- Respiratory


Respiratory: No respiratory distress, Clear bilaterally





- Abdomen


Abdomen: Soft, Non tender, Non distended





- Back


Back: No spinal TTP





- Derm


Derm: Warm and dry





- Extremities


Extremities: Other (Mild ecchymosis over the tibial plateau on the right lower 

extremity.  No joint effusion.  No tenderness over the patella.  ACL, MCL, PCL, 

LCL are intact.  Full range of motion.  Otherwise normal examination of the 

right lower extremity.  Full range of motion of the hip)





- Neuro


Neuro: Alert and oriented X 3, CNs 2-12 intact, No motor deficit, No sensory 

deficit, Normal speech


Eye Opening: Spontaneous


Motor: Obeys Commands


Verbal: Oriented


GCS Score: 15





Results





- Vitals


Vitals: 


                               Vital Signs - 24 hr











  12/12/22





  09:51


 


Temperature 36.4 C L


 


Heart Rate 64


 


Respiratory 16





Rate 


 


Blood Pressure 143/75 H


 


O2 Saturation 98








                                     Oxygen











O2 Source                      Room air

















- Rads (name of study)


  ** Right knee x-ray


Radiology: Final report received, EMP read contemporaneously, See rad report





PD MEDICAL DECISION MAKING





- ED course


Complexity details: reviewed results, re-evaluated patient, considered 

differential, d/w patient


ED course: 


Patient with right knee pain status post fall.  Appears to have a contusion.  No

acute findings on x-ray.  She is able to ambulate well in the emergency 

department and has a walker at home.  No ligamentous instability.  She declines 

anything stronger for pain.  She will follow-up with her doctor for further 

care.  Patient counseled regarding signs and symptoms for which I believe and 

urgent re-evaluation would be necessary. Patient with good understanding of and 

agreement to plan and is comfortable going home at this time





This document was made in part using voice recognition software. While efforts 

are made to proofread this document, sound alike and grammatical errors may 

occur.





Departure





- Departure


Disposition: 01 Home, Self Care


Clinical Impression: 


Knee contusion


Qualifiers:


 Encounter type: initial encounter Laterality: left Qualified Code(s): S80.02XA 

- Contusion of left knee, initial encounter





Condition: Good


Instructions:  ED Contusion Lower Ext


Follow-Up: 


Vivian Haynes ARNP [Primary Care Provider] - Within 1 week


Comments: 


Thankfully your x-ray does not show any acute abnormalities.  I would continue 

Tylenol as needed for pain at home.  Use a walker to help you at home as well.  

You can rest and ice the knee as well.  Please return if you worsen.


Discharge Date/Time: 12/12/22 13:16

## 2023-01-23 ENCOUNTER — HOSPITAL ENCOUNTER (OUTPATIENT)
Dept: HOSPITAL 76 - DI | Age: 78
Discharge: HOME | End: 2023-01-23
Attending: GENERAL ACUTE CARE HOSPITAL
Payer: MEDICARE

## 2023-01-23 DIAGNOSIS — Z12.31: Primary | ICD-10-CM

## 2023-01-24 NOTE — MAMMOGRAPHY REPORT
BILATERAL DIGITAL SCREENING MAMMOGRAM 3D/2D: 1/23/2023

 

CLINICAL: Routine screening.  

 

Comparison is made to exams dated:  8/22/2016 mammogram, 2/20/2013 mammogram, and 10/6/2011 mammogram
 - [facility].  

 

Both breasts are almost entirely fatty (category a/<25% glandular tissue).  

 

No significant masses, calcifications, or other findings are seen in either breast.  

There has been no significant interval change.

 

IMPRESSION: NEGATIVE

There is no mammographic evidence of malignancy. A 1 year screening mammogram is recommended.  

 

Based on the Tyrer Cuzick model (a risk assessment model) the patients lifetime risk is 2.2% and her
 10 year risk is 0.0%. According to the ACR, ACS, and NCCN guidelines, an annual breast MRI exam petrona
g with mammogram is recommended if the patients lifetime risk is 20% or greater.

 

 

This exam was interpreted at Station ID: [id number].  

 

NOTE: For mammograms, a report in lay terms will be sent to the patient. Approximately 15% of breast 
malignancies will not be visualized mammographically. In the management of a palpable breast mass, a 
negative mammogram must not discourage biopsy of a clinically suspicious lesion.

 

Electronically Signed By: Leonel Echols M.D.          

aty/penrad:1/23/2023 10:08:41  

 

 

 

ACR BI-RADS Category 1: Negative 3341F

PARENCHYMAL PATTERN: (F) - The breast(s) demonstrate(s) diffuse fatty replacement.

BI-RADS CATEGORY: (1) - 1

RECOMMENDATION: (ANNUAL)  - Recommend routine annual screening mammography.

[id number]

1 year screening

LATERALITY: (B)

## 2023-03-06 ENCOUNTER — HOSPITAL ENCOUNTER (OUTPATIENT)
Dept: HOSPITAL 76 - DI | Age: 78
Discharge: HOME | End: 2023-03-06
Attending: NURSE PRACTITIONER
Payer: MEDICARE

## 2023-03-06 DIAGNOSIS — R91.8: Primary | ICD-10-CM

## 2023-03-06 NOTE — CT REPORT
PROCEDURE:  CHEST WO

 

INDICATIONS:  INDETERMINATE PULMONARY NODULES

 

TECHNIQUE: 

Noncontrast 1mm axial images were acquired from the pulmonary apices to the posterior costophrenic an
gles.  Axial 5 mm soft tissue kernel reconstructions were performed as well as 8 mm axial MIP and cor
onal and sagittal 5 mm reformations. For radiation dose reduction, the following was used: automate
d exposure control, adjustment of mA and/or kV according to patient size.

 

COMPARISON:  CT angiogram of the chest dated 06/09/2013, CT of the abdomen without contrast dated 7/8
/2016, CT chest dated 8/25/2022, CT chest without contrast dated 11/12/2022

 

FINDINGS:  

Image quality:  Excellent.  

 

Lungs and pleura: There is a density in the anterior medial right lung base which was minimally prese
nt in 2013, was slightly increased in 2016, and has significantly increased in size between 2016 and 
the current studies it is a density with ill-defined borders and it measures at least 2.7 x 1.4 cm on
 current image 205/4.  Recommend PET/CT for further evaluation. No additional pulmonary nodules. No p
leural effusions or pneumothorax.  Central and peripheral airways are patent and normal in caliber.  


 

Mediastinum:  Heart size is normal.  No pericardial effusion. Severe coronary artery calcifications. 
No mediastinal adenopathy by size criteria.  Thoracic aorta and central pulmonary arteries are normal
 in size.  Esophagus is normal in caliber.  No hiatal hernia.  

 

Bones and chest wall:  No suspicious bony lesions.  No vertebral body compression fractures.  No axil
yogi or supraclavicular adenopathy by size criteria.  The thyroid is normal in size and there are no 
incidental findings.

 

Abdomen:  Visualized upper abdominal solid organs and bowel loops appear normal in the absence of con
trast.  

 

IMPRESSION:  

An ill-defined density in the anteromedial basilar portion of the right lower lobe has clearly increa
sed in size over a period of multiple years. The possibility of an indolent malignancy exists.

 

Comment: Recommend PET/CT.

 

 

CLINICAL RECOMMENDATION STATEMENTS:

In patients <35 years with an ITN detected on CT, MRI, or extrathyroidal ultrasound, the Committee re
commends further evaluation with dedicated thyroid ultrasound if the nodule is "e1 cm and has no susp
icious imaging features, and if the patient has normal life expectancy.

In patients "e35 years with an ITN detected on CT, MRI, or extrathyroidal ultrasound, the Committee r
ecommends further evaluation with dedicated thyroid ultrasound if the nodule is "e1.5 cm and has no s
uspicious imaging features, and if the patient has normal life expectancy. (ACR, 2014)

 

Reviewed by: Arpan Tyler MD on 3/6/2023 12:10 PM PST

Approved by: Arpan Tyler MD on 3/6/2023 12:10 PM PST

 

 

Station ID:  [id number]

## 2023-06-03 ENCOUNTER — HOSPITAL ENCOUNTER (EMERGENCY)
Dept: HOSPITAL 76 - ED | Age: 78
Discharge: HOME | End: 2023-06-03
Payer: MEDICARE

## 2023-06-03 VITALS — DIASTOLIC BLOOD PRESSURE: 70 MMHG | SYSTOLIC BLOOD PRESSURE: 150 MMHG

## 2023-06-03 DIAGNOSIS — Z79.4: ICD-10-CM

## 2023-06-03 DIAGNOSIS — E11.9: ICD-10-CM

## 2023-06-03 DIAGNOSIS — B02.9: Primary | ICD-10-CM

## 2023-06-03 DIAGNOSIS — I10: ICD-10-CM

## 2023-06-03 PROCEDURE — 99283 EMERGENCY DEPT VISIT LOW MDM: CPT

## 2023-06-03 PROCEDURE — 87430 STREP A AG IA: CPT

## 2023-06-03 PROCEDURE — 87070 CULTURE OTHR SPECIMN AEROBIC: CPT

## 2023-06-03 NOTE — ED PHYSICIAN DOCUMENTATION
History of Present Illness





- Stated complaint


Stated Complaint: THROAT PX/RASH





- Chief complaint


Chief Complaint: General





- Additonal information


Additional information: 





78-year-old female presents emergency department for evaluation of a rash as 

well as a sore throat.  Reports that about 5 to 6 days ago she developed a red 

blanching nonvesicular rash on her right forearm that went away and has come 

back.  Several days later she developed a vesicular painful rash on her left 

posterior back as well as smaller superficial regions on her left breast.  This 

would be in the T5-T6 region. Patient was started on valacyclovir by an 

outpatient provider local walk-in clinic but told if worsen to come to the ER.  

She states that today she had a sore throat.  She has had no fevers.  No cough. 

Denies chest pain or shortness of air.  No abdominal pain nausea or vomiting.  

She is a diabetic and reports her sugars have been higher recently in the 250s 

then she would like them.  Typically they are about 150-200.





Review of Systems


Constitutional: denies: Fever, Chills


Ears: reports: Reviewed and negative


Cardiac: reports: Reviewed and negative


Respiratory: reports: Reviewed and negative


GI: reports: Reviewed and negative


: reports: Reviewed and negative


Skin: reports: Rash





PD PAST MEDICAL HISTORY





- Past Medical History


Past Medical History: Yes


Cardiovascular: Hypertension, High cholesterol, Peripheral Vascular Disease, 

Deep vein thrombosis, Angina, Other


Respiratory: Sleep apnea


Neuro: None


Endocrine/Autoimmune: Type 2 diabetes


GI: GERD, Colon polyps, Chronic constipation


: Incontinence


HEENT: Chronic vision loss, Other


Psych: Depression


Musculoskeletal: Osteoarthritis, Gout, Chronic back pain


Derm: Other





- Past Surgical History


Past Surgical History: Yes


General: Cholecystectomy, Appendectomy, Colonoscopy, Other


Cardiovascular: Coronary stent





- Present Medications


Home Medications: 


                                Ambulatory Orders











 Medication  Instructions  Recorded  Confirmed


 


Insulin Aspart (Vial) [NovoLOG] 16 units SUBQ TIDWM 03/14/14 06/03/23


 


Insulin Glargine [Lantus Solostar] 16 units SQ BID 05/31/16 06/03/23


 


Dulaglutide [Trulicity] 4.5 mg SQ OAW 05/22/20 06/03/23


 


Amlodipine Besylate 5 mg PO DAILY 10/29/20 06/03/23


 


Aspirin Chewable [St Jayson 81 mg PO DAILY PM 10/29/20 06/03/23





Aspirin]   


 


Gabapentin [Neurontin] 300 mg PO DAILY PM 10/29/20 06/03/23


 


Rosuvastatin Calcium 40 mg PO DAILY PM 10/29/20 06/03/23


 


Nitroglycerin [Nitrostat] 1 tab PO PRN PRN 12/18/20 06/03/23


 


Spironolactone [Aldactone] 25 mg PO DAILY 12/18/20 06/03/23


 


Magnesium Oxide [Magnesium] 400 mg ORAL DAILY PM 12/01/22 06/03/23


 


Gabapentin [Neurontin] 300 mg PO TID #27 cap 06/03/23 














- Allergies


Allergies/Adverse Reactions: 


                                    Allergies











Allergy/AdvReac Type Severity Reaction Status Date / Time


 


succinylcholine Allergy Unknown Respiratory Verified 06/03/23 15:04


 


losartan [From Cozaar] Allergy  Unknown Verified 06/03/23 15:04


 


tramadol Allergy  Unknown Verified 06/03/23 15:04


 


bacitracin AdvReac Unknown Rash Verified 06/03/23 15:04





[From Neosporin     





([id number])]     


 


bacitracin zinc * AdvReac Unknown Rash Verified 06/03/23 15:04





[From Neosporin     





(pbu-[id number])]     


 


diphenhydramine HCl * AdvReac Unknown Edema Verified 06/03/23 15:04





[From Benadryl]     


 


neomycin sulfate * AdvReac Unknown Rash Verified 06/03/23 15:04





[From Neosporin     





([id number]-[id number])]     


 


polymyxin B AdvReac Unknown Rash Verified 06/03/23 15:04





[From Neosporin     





([id number])]     


 


Sulfa (Sulfonamide AdvReac Unknown Rash Verified 06/03/23 15:04





Antibiotics)     














- Social History


Does the pt smoke?: No


Smoking Status: Never smoker


Does the pt drink ETOH?: No


Does the pt have substance abuse?: No





- Immunizations


Immunizations are current?: Yes





- POLST


Patient has POLST: No





PD ED PE EXPANDED





- General


General: Alert, No acute distress, Other (Appears remarkably well despite ge

riatric age.)





- Cardiac


Cardiac: Regular Rate.  No: Murmur Present





- Respiratory


Respiratory: Clear to ausultation nikolay.  No: Distress, Labored





- Extremities


Extremities: Right forearm (Papular rash on the right forearm without vesicles. 

Not painful.), Other (Vesicular rash on left posterior thorax in the T5-T6 zone 

with similar associated rash on the left breast.  This is painful.)





Results





- Vitals


Vitals: 





                               Vital Signs - 24 hr











  06/03/23





  15:04


 


Temperature 37.2 C


 


Heart Rate 72


 


Respiratory 18





Rate 


 


Blood Pressure 150/70 H


 


O2 Saturation 99








                                     Oxygen











O2 Source                      Room air

















PD Medical Decision Making





- ED course


Complexity details: reviewed results, re-evaluated patient, considered 

differential, d/w patient


ED course: 





This is a remarkably well-appearing 78-year-old female who presents emergency 

department for evaluation of a sore throat and reevaluation of her shingles 

rash.  She developed a faint papular rash on her right forearm 5 days ago.  

However it was not vesicular nor was it painful.  About 3 days ago she developed

a painful vesicular rash on her left breast and posterior thorax.  This is in 

the T5-6 dermatomal region and is consistent with shingles it is painful.  She 

saw a local walk-in clinic provider and was started on valacyclovir.  Here in 

the emergency department she has no evidence of disseminated zoster.  She has no

fevers.  She is neurologically intact with no focal deficits.  Clinically she 

does not have evidence of [place] syndrome





In order to help manage the pain she will be started on Neurontin.  Patient had 

reported a sore throat and per my exam there were no acute findings in the 

posterior oropharynx and a rapid strep was negative.





I am encouraging the patient to follow closely with her PCP.  Usual emergent 

return precautions for worsening symptoms was discussed





Departure





- Departure


Disposition: 01 Home, Self Care


Clinical Impression: 


Shingles


Qualifiers:


 Herpes zoster complications: without complications Qualified Code(s): B02.9 - 

Zoster without complications





Condition: Stable


Record reviewed to determine appropriate education?: Yes


Instructions:  ED Shingles


Prescriptions: 


Gabapentin [Neurontin] 300 mg PO TID #27 cap


Comments: 


Daniela chavarria are seen today in the emergency department because you have concerns

 of a sore throat and possible worsening of your shingles rash.  The rash on 

your left back and breast is consistent with shingles.  This is within the same 

nerve dermatomes.  The rash in your right forearm does not look like shingles to

 me.  However you have already been started on valacyclovir medication by the 

outpatient walk-in clinic providers.  Please continue this.  Your shingles rash 

is considered contagious until it fully scabs over which can take 7 to 14 days.





To help manage the pain of the rash I am starting you on a medication called 

Neurontin.  You will take 300 mg or 1 tablet the first day, 300 mg twice a day 

on day 2, then you will take 300 mg 3 times a day for days 3 through 10.





Over-the-counter lidocaine cream can also be applied to the rash and may help 

with pain.





Overall your clinical physical exam is good.  Your vital signs did not show any 

worrisome findings.  Your rapid strep testing today is negative.





Please discuss this ED visit with your primary doctor.  You can continue to take

 your usual medications otherwise.  Return to the ER if you have any worsening 

of symptoms develop fevers or have any other emergent concerns

## 2023-06-28 ENCOUNTER — HOSPITAL ENCOUNTER (OUTPATIENT)
Dept: HOSPITAL 76 - LAB | Age: 78
Discharge: HOME | End: 2023-06-28
Attending: NURSE PRACTITIONER
Payer: MEDICARE

## 2023-06-28 DIAGNOSIS — E11.8: ICD-10-CM

## 2023-06-28 DIAGNOSIS — I10: Primary | ICD-10-CM

## 2023-06-28 DIAGNOSIS — E78.5: ICD-10-CM

## 2023-06-28 LAB
ALBUMIN DIAFP-MCNC: 3.9 G/DL (ref 3.2–5.5)
ALBUMIN/GLOB SERPL: 1.1 {RATIO} (ref 1–2.2)
ALP SERPL-CCNC: 75 IU/L (ref 42–121)
ALT SERPL W P-5'-P-CCNC: 19 IU/L (ref 10–60)
ANION GAP SERPL CALCULATED.4IONS-SCNC: 7 MMOL/L (ref 6–13)
AST SERPL W P-5'-P-CCNC: 15 IU/L (ref 10–42)
BASOPHILS NFR BLD AUTO: 0.1 10^3/UL (ref 0–0.1)
BASOPHILS NFR BLD AUTO: 0.8 %
BILIRUB BLD-MCNC: 0.8 MG/DL (ref 0.2–1)
BUN SERPL-MCNC: 23 MG/DL (ref 6–20)
CALCIUM UR-MCNC: 10.2 MG/DL (ref 8.5–10.3)
CHLORIDE SERPL-SCNC: 102 MMOL/L (ref 101–111)
CHOLEST SERPL-MCNC: 131 MG/DL
CO2 SERPL-SCNC: 27 MMOL/L (ref 21–32)
CREAT SERPLBLD-SCNC: 1.4 MG/DL (ref 0.4–1)
EOSINOPHIL # BLD AUTO: 0.3 10^3/UL (ref 0–0.7)
EOSINOPHIL NFR BLD AUTO: 3.6 %
ERYTHROCYTE [DISTWIDTH] IN BLOOD BY AUTOMATED COUNT: 14.4 % (ref 12–15)
EST. AVERAGE GLUCOSE BLD GHB EST-MCNC: 232 MG/DL (ref 70–100)
GFRSERPLBLD MDRD-ARVRAT: 36 ML/MIN/{1.73_M2} (ref 89–?)
GLOBULIN SER-MCNC: 3.7 G/DL (ref 2.1–4.2)
GLUCOSE SERPL-MCNC: 155 MG/DL (ref 70–100)
HBA1C MFR BLD HPLC: 9.7 % (ref 4.27–6.07)
HCT VFR BLD AUTO: 42.3 % (ref 37–47)
HDLC SERPL-MCNC: 47 MG/DL
HDLC SERPL: 2.8 {RATIO} (ref ?–4.4)
HGB UR QL STRIP: 14 G/DL (ref 12–16)
LDLC SERPL CALC-MCNC: 58 MG/DL
LDLC/HDLC SERPL: 1.2 {RATIO} (ref ?–4.4)
LYMPHOCYTES # SPEC AUTO: 2.8 10^3/UL (ref 1.5–3.5)
LYMPHOCYTES NFR BLD AUTO: 31.2 %
MCH RBC QN AUTO: 28.6 PG (ref 27–31)
MCHC RBC AUTO-ENTMCNC: 33.1 G/DL (ref 32–36)
MCV RBC AUTO: 86.5 FL (ref 81–99)
MONOCYTES # BLD AUTO: 0.7 10^3/UL (ref 0–1)
MONOCYTES NFR BLD AUTO: 8.3 %
NEUTROPHILS # BLD AUTO: 4.9 10^3/UL (ref 1.5–6.6)
NEUTROPHILS # SNV AUTO: 8.9 X10^3/UL (ref 4.8–10.8)
NEUTROPHILS NFR BLD AUTO: 55.8 %
NRBC # BLD AUTO: 0 /100WBC
NRBC # BLD AUTO: 0 X10^3/UL
PDW BLD AUTO: 9.3 FL (ref 7.9–10.8)
PLATELET # BLD: 291 10^3/UL (ref 130–450)
POTASSIUM SERPL-SCNC: 4.4 MMOL/L (ref 3.5–5)
PROT SPEC-MCNC: 7.6 G/DL (ref 6.7–8.2)
RBC MAR: 4.89 10^6/UL (ref 4.2–5.4)
SODIUM SERPLBLD-SCNC: 136 MMOL/L (ref 135–145)
TRIGL P FAST SERPL-MCNC: 130 MG/DL
TSH SERPL-ACNC: 3.07 UIU/ML (ref 0.34–5.6)
VLDLC SERPL-SCNC: 26 MG/DL

## 2023-06-28 PROCEDURE — 83721 ASSAY OF BLOOD LIPOPROTEIN: CPT

## 2023-06-28 PROCEDURE — 36415 COLL VENOUS BLD VENIPUNCTURE: CPT

## 2023-06-28 PROCEDURE — 80061 LIPID PANEL: CPT

## 2023-06-28 PROCEDURE — 85025 COMPLETE CBC W/AUTO DIFF WBC: CPT

## 2023-06-28 PROCEDURE — 80053 COMPREHEN METABOLIC PANEL: CPT

## 2023-06-28 PROCEDURE — 83036 HEMOGLOBIN GLYCOSYLATED A1C: CPT

## 2023-06-28 PROCEDURE — 84443 ASSAY THYROID STIM HORMONE: CPT

## 2023-07-17 ENCOUNTER — HOSPITAL ENCOUNTER (OUTPATIENT)
Dept: HOSPITAL 76 - DI | Age: 78
Discharge: HOME | End: 2023-07-17
Attending: NURSE PRACTITIONER
Payer: MEDICARE

## 2023-07-17 DIAGNOSIS — R91.1: Primary | ICD-10-CM

## 2023-07-17 PROCEDURE — 71260 CT THORAX DX C+: CPT

## 2023-07-17 NOTE — CT REPORT
PROCEDURE:  CHEST W

 

INDICATIONS:  LUNG NODULE

 

CONTRAST: 100ml Omni 300 

 

TECHNIQUE:  

After the administration of intravenous contrast, 1 mm axial images were acquired from the pulmonary 
apices through the posterior costophrenic angles.  Axial 5 mm soft tissue kernel reconstructions were
 performed as well as 8 mm axial MIP and coronal and sagittal 5 mm reformations.  For radiation dose 
reduction, the following was used:  automated exposure control, adjustment of mA and/or kV according 
to patient size.

 

COMPARISON:  3/6/2023, 6/8/2016

 

FINDINGS:  

Image quality:  Excellent.  

 

Lungs and pleura: No consolidation. No pleural effusions. No  pneumothorax. Part solid nodule in the 
medial right lower lobe measures 3.2 x 1.3 cm, previously 2.7 x 1.4 cm. Juxtapleural nodules in the u
pper lobes, presumably a benign intrapulmonary lymph nodes. Mild peripheral reticulation without basi
lar predominance, with associated mild bronchial thickening. Stable 4 mm part solid nodule in the rig
ht upper lobe (series 4, image 52). Additional smaller pulmonary nodules are scattered throughout the
 lungs.

 

Mediastinum: Heart size is normal. No pericardial effusion. No large vessel abnormality. No mediastin
al adenopathy by size criteria.  Two-vessel coronary calcifications.

 

Chest wall and lower neck: Thyroid is unremarkable. No axillary or supraclavicular adenopathy by size
. 

 

Bones: No aggressive osseous abnormality.

 

Upper Abdomen: Stable left adrenal nodule since 2016, statistically benign.

 

IMPRESSION:

Slight interval growth of the part solid nodule in the medial right lower lobe. Appearance and growth
 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 Conside
r PET/CT or pulmonology referral.

 

 

 

Reviewed by: Chad Deleon on 7/17/2023 2:52 PM PDT

Approved by: Chad Deleon on 7/17/2023 2:52 PM PDT

 

 

Station ID:  [id number]

## 2024-02-07 ENCOUNTER — HOSPITAL ENCOUNTER (OUTPATIENT)
Dept: HOSPITAL 76 - DI | Age: 79
Discharge: HOME | End: 2024-02-07
Attending: GENERAL ACUTE CARE HOSPITAL
Payer: MEDICARE

## 2024-02-07 DIAGNOSIS — Z12.31: Primary | ICD-10-CM

## 2024-02-08 NOTE — MAMMOGRAPHY REPORT
BILATERAL DIGITAL SCREENING MAMMOGRAM 3D/2D: 2/7/2024

 

CLINICAL: Routine screening.  

 

Comparison is made to exams dated:  1/23/2023 mammogram and 8/22/2016 mammogram - [facility].  

 

Both breasts are almost entirely fatty (category a/<25% glandular tissue).  

 

No significant masses, calcifications, or other findings are seen in either breast.  

There has been no significant interval change.

 

IMPRESSION: NEGATIVE

There is no mammographic evidence of malignancy. A 1 year screening mammogram is recommended.  

 

Based on the Tyrer Cuzick model (a risk assessment model) the patient's lifetime risk is 0.9% and her
 10 year risk is 0.0%. According to the ACR, ACS, and NCCN guidelines, an annual breast MRI exam petrona
g with mammogram is recommended if the patient's lifetime risk is 20% or greater.

 

 

This exam was interpreted at Station ID: [id number].  

 

NOTE: For mammograms, a report in lay terms will be sent to the patient. Approximately 15% of breast 
malignancies will not be visualized mammographically. In the management of a palpable breast mass, a 
negative mammogram must not discourage biopsy of a clinically suspicious lesion.

 

Electronically Signed By: Justine paz/clinton:2/7/2024 14:30:47  

 

 

letter sent: No_Letter  

ACR BI-RADS Category 1: Negative 3341F

PARENCHYMAL PATTERN: (F) - The breast(s) demonstrate(s) diffuse fatty replacement.

BI-RADS CATEGORY: (1) - 1

Mammogram

[id number]

1 year screening

LATERALITY: (B)

## 2024-02-20 ENCOUNTER — HOSPITAL ENCOUNTER (OUTPATIENT)
Dept: HOSPITAL 76 - LAB | Age: 79
Discharge: HOME | End: 2024-02-20
Attending: NURSE PRACTITIONER
Payer: MEDICARE

## 2024-02-20 DIAGNOSIS — E11.65: Primary | ICD-10-CM

## 2024-02-20 DIAGNOSIS — I10: ICD-10-CM

## 2024-02-20 DIAGNOSIS — E78.5: ICD-10-CM

## 2024-02-20 LAB
ANION GAP SERPL CALCULATED.4IONS-SCNC: 5 MMOL/L (ref 6–13)
BASOPHILS NFR BLD AUTO: 0 10^3/UL (ref 0–0.1)
BASOPHILS NFR BLD AUTO: 0.3 %
BUN SERPL-MCNC: 13 MG/DL (ref 6–20)
CALCIUM UR-MCNC: 10 MG/DL (ref 8.5–10.3)
CHLORIDE SERPL-SCNC: 103 MMOL/L (ref 101–111)
CHOLEST SERPL-MCNC: 127 MG/DL
CO2 SERPL-SCNC: 27 MMOL/L (ref 21–32)
CREAT SERPLBLD-SCNC: 0.9 MG/DL (ref 0.6–1.3)
CREAT UR-SCNC: 133.8 MG/DL
EOSINOPHIL # BLD AUTO: 0.4 10^3/UL (ref 0–0.7)
EOSINOPHIL NFR BLD AUTO: 5.6 %
ERYTHROCYTE [DISTWIDTH] IN BLOOD BY AUTOMATED COUNT: 13.2 % (ref 12–15)
EST. AVERAGE GLUCOSE BLD GHB EST-MCNC: 209 MG/DL (ref 70–100)
GFRSERPLBLD MDRD-ARVRAT: 60 ML/MIN/{1.73_M2} (ref 89–?)
GLUCOSE SERPL-MCNC: 203 MG/DL (ref 74–104)
HBA1C MFR BLD HPLC: 8.9 % (ref 4.27–6.07)
HCT VFR BLD AUTO: 43.3 % (ref 37–47)
HDLC SERPL-MCNC: 49 MG/DL
HDLC SERPL: 2.6 {RATIO} (ref ?–4.4)
HGB UR QL STRIP: 14 G/DL (ref 12–16)
LDLC SERPL CALC-MCNC: 51 MG/DL
LDLC/HDLC SERPL: 1 {RATIO} (ref ?–4.4)
LYMPHOCYTES # SPEC AUTO: 1.9 10^3/UL (ref 1.5–3.5)
LYMPHOCYTES NFR BLD AUTO: 27.8 %
MCH RBC QN AUTO: 27.7 PG (ref 27–31)
MCHC RBC AUTO-ENTMCNC: 32.3 G/DL (ref 32–36)
MCV RBC AUTO: 85.6 FL (ref 81–99)
MICROALBUMIN UR-MCNC: 5.9 MG/DL
MICROALBUMIN/CREAT RATIO PNL UR: 44.1 UG/MG (ref ?–30)
MONOCYTES # BLD AUTO: 0.6 10^3/UL (ref 0–1)
MONOCYTES NFR BLD AUTO: 8.4 %
NEUTROPHILS # BLD AUTO: 3.9 10^3/UL (ref 1.5–6.6)
NEUTROPHILS # SNV AUTO: 6.8 X10^3/UL (ref 4.8–10.8)
NEUTROPHILS NFR BLD AUTO: 57.8 %
NRBC # BLD AUTO: 0 /100WBC
NRBC # BLD AUTO: 0 X10^3/UL
PDW BLD AUTO: 8.9 FL (ref 7.9–10.8)
PLATELET # BLD: 262 10^3/UL (ref 130–450)
POTASSIUM SERPL-SCNC: 4.3 MMOL/L (ref 3.5–4.5)
RBC MAR: 5.06 10^6/UL (ref 4.2–5.4)
SODIUM SERPLBLD-SCNC: 135 MMOL/L (ref 135–145)
TRIGL P FAST SERPL-MCNC: 135 MG/DL (ref 48–352)
TSH SERPL-ACNC: 4.22 UIU/ML (ref 0.34–5.6)
VLDLC SERPL-SCNC: 27 MG/DL

## 2024-02-20 PROCEDURE — 82043 UR ALBUMIN QUANTITATIVE: CPT

## 2024-02-20 PROCEDURE — 36415 COLL VENOUS BLD VENIPUNCTURE: CPT

## 2024-02-20 PROCEDURE — 83721 ASSAY OF BLOOD LIPOPROTEIN: CPT

## 2024-02-20 PROCEDURE — 84443 ASSAY THYROID STIM HORMONE: CPT

## 2024-02-20 PROCEDURE — 83036 HEMOGLOBIN GLYCOSYLATED A1C: CPT

## 2024-02-20 PROCEDURE — 82570 ASSAY OF URINE CREATININE: CPT

## 2024-02-20 PROCEDURE — 85025 COMPLETE CBC W/AUTO DIFF WBC: CPT

## 2024-02-20 PROCEDURE — 80061 LIPID PANEL: CPT

## 2024-02-20 PROCEDURE — 80048 BASIC METABOLIC PNL TOTAL CA: CPT

## 2024-02-26 ENCOUNTER — HOSPITAL ENCOUNTER (OUTPATIENT)
Dept: HOSPITAL 76 - LAB | Age: 79
Discharge: HOME | End: 2024-02-26
Attending: INTERNAL MEDICINE
Payer: MEDICARE

## 2024-02-26 DIAGNOSIS — C34.31: Primary | ICD-10-CM

## 2024-02-26 LAB
BASOPHILS NFR BLD AUTO: 0 10^3/UL (ref 0–0.1)
BASOPHILS NFR BLD AUTO: 0.5 %
EOSINOPHIL # BLD AUTO: 0.3 10^3/UL (ref 0–0.7)
EOSINOPHIL NFR BLD AUTO: 3.8 %
ERYTHROCYTE [DISTWIDTH] IN BLOOD BY AUTOMATED COUNT: 13.2 % (ref 12–15)
HCT VFR BLD AUTO: 45.1 % (ref 37–47)
HGB UR QL STRIP: 14.5 G/DL (ref 12–16)
LYMPHOCYTES # SPEC AUTO: 2.9 10^3/UL (ref 1.5–3.5)
LYMPHOCYTES NFR BLD AUTO: 35.1 %
MCH RBC QN AUTO: 27.6 PG (ref 27–31)
MCHC RBC AUTO-ENTMCNC: 32.2 G/DL (ref 32–36)
MCV RBC AUTO: 85.9 FL (ref 81–99)
MONOCYTES # BLD AUTO: 0.7 10^3/UL (ref 0–1)
MONOCYTES NFR BLD AUTO: 8 %
NEUTROPHILS # BLD AUTO: 4.3 10^3/UL (ref 1.5–6.6)
NEUTROPHILS # SNV AUTO: 8.2 X10^3/UL (ref 4.8–10.8)
NEUTROPHILS NFR BLD AUTO: 52.4 %
NRBC # BLD AUTO: 0 /100WBC
NRBC # BLD AUTO: 0 X10^3/UL
PDW BLD AUTO: 9.3 FL (ref 7.9–10.8)
PLATELET # BLD: 294 10^3/UL (ref 130–450)
RBC MAR: 5.25 10^6/UL (ref 4.2–5.4)

## 2024-02-26 PROCEDURE — 85025 COMPLETE CBC W/AUTO DIFF WBC: CPT

## 2024-02-26 PROCEDURE — 36415 COLL VENOUS BLD VENIPUNCTURE: CPT

## 2024-02-26 PROCEDURE — 82378 CARCINOEMBRYONIC ANTIGEN: CPT

## 2024-06-25 ENCOUNTER — HOSPITAL ENCOUNTER (OUTPATIENT)
Dept: HOSPITAL 76 - LAB | Age: 79
Discharge: HOME | End: 2024-06-25
Attending: NURSE PRACTITIONER
Payer: MEDICARE

## 2024-06-25 DIAGNOSIS — E78.5: ICD-10-CM

## 2024-06-25 DIAGNOSIS — E11.8: ICD-10-CM

## 2024-06-25 DIAGNOSIS — I10: Primary | ICD-10-CM

## 2024-06-25 LAB
ANION GAP SERPL CALCULATED.4IONS-SCNC: 3 MMOL/L (ref 6–13)
BASOPHILS NFR BLD AUTO: 0.1 10^3/UL (ref 0–0.1)
BASOPHILS NFR BLD AUTO: 0.6 %
BUN SERPL-MCNC: 17 MG/DL (ref 6–20)
CALCIUM UR-MCNC: 10.3 MG/DL (ref 8.5–10.3)
CHLORIDE SERPL-SCNC: 105 MMOL/L (ref 101–111)
CHOLEST SERPL-MCNC: 142 MG/DL
CO2 SERPL-SCNC: 30 MMOL/L (ref 21–32)
CREAT SERPLBLD-SCNC: 0.9 MG/DL (ref 0.6–1.3)
CREAT UR-SCNC: 48.1 MG/DL
EOSINOPHIL # BLD AUTO: 0.3 10^3/UL (ref 0–0.7)
EOSINOPHIL NFR BLD AUTO: 4.1 %
ERYTHROCYTE [DISTWIDTH] IN BLOOD BY AUTOMATED COUNT: 13.1 % (ref 12–15)
EST. AVERAGE GLUCOSE BLD GHB EST-MCNC: 192 MG/DL (ref 70–100)
GFRSERPLBLD MDRD-ARVRAT: 60 ML/MIN/{1.73_M2} (ref 89–?)
GLUCOSE SERPL-MCNC: 149 MG/DL (ref 74–104)
HBA1C MFR BLD HPLC: 8.3 % (ref 4.27–6.07)
HCT VFR BLD AUTO: 40.9 % (ref 37–47)
HDLC SERPL-MCNC: 52 MG/DL
HDLC SERPL: 2.7 {RATIO} (ref ?–4.4)
HGB UR QL STRIP: 13.3 G/DL (ref 12–16)
LDLC SERPL CALC-MCNC: 71 MG/DL
LDLC/HDLC SERPL: 1.4 {RATIO} (ref ?–4.4)
LYMPHOCYTES # SPEC AUTO: 2.8 10^3/UL (ref 1.5–3.5)
LYMPHOCYTES NFR BLD AUTO: 34.4 %
MCH RBC QN AUTO: 28.2 PG (ref 27–31)
MCHC RBC AUTO-ENTMCNC: 32.5 G/DL (ref 32–36)
MCV RBC AUTO: 86.8 FL (ref 81–99)
MICROALBUMIN UR-MCNC: 0.9 MG/DL
MICROALBUMIN/CREAT RATIO PNL UR: 18.7 UG/MG (ref ?–30)
MONOCYTES # BLD AUTO: 0.6 10^3/UL (ref 0–1)
MONOCYTES NFR BLD AUTO: 7.3 %
NEUTROPHILS # BLD AUTO: 4.3 10^3/UL (ref 1.5–6.6)
NEUTROPHILS # SNV AUTO: 8 X10^3/UL (ref 4.8–10.8)
NEUTROPHILS NFR BLD AUTO: 53.4 %
NRBC # BLD AUTO: 0 /100WBC
NRBC # BLD AUTO: 0 X10^3/UL
PDW BLD AUTO: 9.1 FL (ref 7.9–10.8)
PLATELET # BLD: 247 10^3/UL (ref 130–450)
POTASSIUM SERPL-SCNC: 4.6 MMOL/L (ref 3.5–4.5)
RBC MAR: 4.71 10^6/UL (ref 4.2–5.4)
SODIUM SERPLBLD-SCNC: 138 MMOL/L (ref 135–145)
TRIGL P FAST SERPL-MCNC: 97 MG/DL (ref 48–352)
TSH SERPL-ACNC: 4.14 UIU/ML (ref 0.34–5.6)
VLDLC SERPL-SCNC: 19 MG/DL

## 2024-06-25 PROCEDURE — 80061 LIPID PANEL: CPT

## 2024-06-25 PROCEDURE — 82043 UR ALBUMIN QUANTITATIVE: CPT

## 2024-06-25 PROCEDURE — 84443 ASSAY THYROID STIM HORMONE: CPT

## 2024-06-25 PROCEDURE — 85025 COMPLETE CBC W/AUTO DIFF WBC: CPT

## 2024-06-25 PROCEDURE — 36415 COLL VENOUS BLD VENIPUNCTURE: CPT

## 2024-06-25 PROCEDURE — 80048 BASIC METABOLIC PNL TOTAL CA: CPT

## 2024-06-25 PROCEDURE — 82570 ASSAY OF URINE CREATININE: CPT

## 2024-06-25 PROCEDURE — 83036 HEMOGLOBIN GLYCOSYLATED A1C: CPT

## 2024-06-25 PROCEDURE — 83721 ASSAY OF BLOOD LIPOPROTEIN: CPT

## 2024-09-19 ENCOUNTER — HOSPITAL ENCOUNTER (OUTPATIENT)
Dept: HOSPITAL 76 - DI.N | Age: 79
Discharge: HOME | End: 2024-09-19
Attending: NURSE PRACTITIONER
Payer: MEDICARE

## 2024-09-19 ENCOUNTER — HOSPITAL ENCOUNTER (OUTPATIENT)
Dept: HOSPITAL 76 - LAB.N | Age: 79
Discharge: HOME | End: 2024-09-19
Attending: NURSE PRACTITIONER
Payer: MEDICARE

## 2024-09-19 DIAGNOSIS — E11.65: ICD-10-CM

## 2024-09-19 DIAGNOSIS — M19.072: ICD-10-CM

## 2024-09-19 DIAGNOSIS — E11.65: Primary | ICD-10-CM

## 2024-09-19 DIAGNOSIS — E78.5: ICD-10-CM

## 2024-09-19 DIAGNOSIS — M85.88: ICD-10-CM

## 2024-09-19 DIAGNOSIS — M21.42: ICD-10-CM

## 2024-09-19 DIAGNOSIS — I10: Primary | ICD-10-CM

## 2024-09-19 DIAGNOSIS — I10: ICD-10-CM

## 2024-09-19 LAB
ANION GAP SERPL CALCULATED.4IONS-SCNC: 5 MMOL/L (ref 6–13)
BASOPHILS NFR BLD AUTO: 0.1 10^3/UL (ref 0–0.1)
BASOPHILS NFR BLD AUTO: 0.7 %
BUN SERPL-MCNC: 21 MG/DL (ref 6–20)
CALCIUM UR-MCNC: 9.9 MG/DL (ref 8.5–10.3)
CHLORIDE SERPL-SCNC: 103 MMOL/L (ref 101–111)
CHOLEST SERPL-MCNC: 137 MG/DL
CO2 SERPL-SCNC: 27 MMOL/L (ref 21–32)
CREAT SERPLBLD-SCNC: 1 MG/DL (ref 0.6–1.3)
CREAT UR-SCNC: 99 MG/DL
EOSINOPHIL # BLD AUTO: 0.3 10^3/UL (ref 0–0.7)
EOSINOPHIL NFR BLD AUTO: 4.4 %
ERYTHROCYTE [DISTWIDTH] IN BLOOD BY AUTOMATED COUNT: 13.6 % (ref 12–15)
EST. AVERAGE GLUCOSE BLD GHB EST-MCNC: 171 MG/DL (ref 70–100)
GFRSERPLBLD MDRD-ARVRAT: 53 ML/MIN/{1.73_M2} (ref 89–?)
GLUCOSE SERPL-MCNC: 114 MG/DL (ref 74–104)
HBA1C MFR BLD HPLC: 7.6 % (ref 4.27–6.07)
HCT VFR BLD AUTO: 40.4 % (ref 37–47)
HDLC SERPL-MCNC: 46 MG/DL
HDLC SERPL: 3 {RATIO} (ref ?–4.4)
HGB UR QL STRIP: 13.1 G/DL (ref 12–16)
LDLC SERPL CALC-MCNC: 64 MG/DL
LDLC/HDLC SERPL: 1.4 {RATIO} (ref ?–4.4)
LYMPHOCYTES # SPEC AUTO: 2.7 10^3/UL (ref 1.5–3.5)
LYMPHOCYTES NFR BLD AUTO: 36 %
MCH RBC QN AUTO: 28.7 PG (ref 27–31)
MCHC RBC AUTO-ENTMCNC: 32.4 G/DL (ref 32–36)
MCV RBC AUTO: 88.6 FL (ref 81–99)
MICROALBUMIN UR-MCNC: 3 MG/DL
MICROALBUMIN/CREAT RATIO PNL UR: 30.3 UG/MG (ref ?–30)
MONOCYTES # BLD AUTO: 0.6 10^3/UL (ref 0–1)
MONOCYTES NFR BLD AUTO: 8 %
NEUTROPHILS # BLD AUTO: 3.8 10^3/UL (ref 1.5–6.6)
NEUTROPHILS # SNV AUTO: 7.5 X10^3/UL (ref 4.8–10.8)
NEUTROPHILS NFR BLD AUTO: 50.5 %
NRBC # BLD AUTO: 0 /100WBC
NRBC # BLD AUTO: 0 X10^3/UL
PDW BLD AUTO: 10.1 FL (ref 7.9–10.8)
PLATELET # BLD: 295 10^3/UL (ref 130–450)
POTASSIUM SERPL-SCNC: 4.4 MMOL/L (ref 3.5–4.5)
RBC MAR: 4.56 10^6/UL (ref 4.2–5.4)
SODIUM SERPLBLD-SCNC: 135 MMOL/L (ref 135–145)
TRIGL P FAST SERPL-MCNC: 137 MG/DL
TSH SERPL-ACNC: 3.38 UIU/ML (ref 0.34–5.6)
VLDLC SERPL-SCNC: 27 MG/DL

## 2024-09-19 PROCEDURE — 80061 LIPID PANEL: CPT

## 2024-09-19 PROCEDURE — 83036 HEMOGLOBIN GLYCOSYLATED A1C: CPT

## 2024-09-19 PROCEDURE — 82043 UR ALBUMIN QUANTITATIVE: CPT

## 2024-09-19 PROCEDURE — 36415 COLL VENOUS BLD VENIPUNCTURE: CPT

## 2024-09-19 PROCEDURE — 84443 ASSAY THYROID STIM HORMONE: CPT

## 2024-09-19 PROCEDURE — 85025 COMPLETE CBC W/AUTO DIFF WBC: CPT

## 2024-09-19 PROCEDURE — 82570 ASSAY OF URINE CREATININE: CPT

## 2024-09-19 PROCEDURE — 83721 ASSAY OF BLOOD LIPOPROTEIN: CPT

## 2024-09-19 PROCEDURE — 80048 BASIC METABOLIC PNL TOTAL CA: CPT

## 2024-09-19 NOTE — XRAY REPORT
PROCEDURE: Foot 1-2V LT (Weight Bearing)

 

INDICATIONS: DIABETES MELLITUS, TYPE 2, UNCONTROLLED

 

TECHNIQUE: 2 views of left foot.

 

COMPARISON: None.

 

FINDINGS: 

There is pes planus with weightbearing. Moderate osteoarthritic changes are noted throughout left ej
t most notably involving TMT joints with prominent dorsal marginal osteophyte formation. Well-defined
 plantar calcaneal enthesophyte is seen. There is osteopenia. No acute fracture or dislocation.

 

IMPRESSION: 

Diffuse osteopenia and moderate left foot osteoarthritis most notably involving TMT joints. No acute 
fracture or dislocation. No radiographic evidence of osteomyelitis. Pes planus with weightbearing. No
 abnormal soft tissue calcifications.

 

Reviewed by: Kashif Broderick MD on 9/19/2024 4:51 PM PDT

Approved by: Kashif Broderick MD on 9/19/2024 4:51 PM PDT

 

 

Station ID:  [id number]
